# Patient Record
Sex: MALE | Race: WHITE | NOT HISPANIC OR LATINO | Employment: FULL TIME | URBAN - METROPOLITAN AREA
[De-identification: names, ages, dates, MRNs, and addresses within clinical notes are randomized per-mention and may not be internally consistent; named-entity substitution may affect disease eponyms.]

---
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
## 2019-01-01 ENCOUNTER — APPOINTMENT (OUTPATIENT)
Dept: RADIOLOGY | Facility: MEDICAL CENTER | Age: 19
DRG: 871 | End: 2019-01-01
Attending: INTERNAL MEDICINE
Payer: MEDICAID

## 2019-01-01 ENCOUNTER — APPOINTMENT (OUTPATIENT)
Dept: RADIOLOGY | Facility: MEDICAL CENTER | Age: 19
DRG: 951 | End: 2019-01-01
Payer: COMMERCIAL

## 2019-01-01 ENCOUNTER — APPOINTMENT (OUTPATIENT)
Dept: CARDIOLOGY | Facility: MEDICAL CENTER | Age: 19
DRG: 871 | End: 2019-01-01
Attending: INTERNAL MEDICINE
Payer: MEDICAID

## 2019-01-01 ENCOUNTER — HOSPITAL ENCOUNTER (INPATIENT)
Facility: MEDICAL CENTER | Age: 19
LOS: 2 days | DRG: 871 | End: 2019-12-07
Attending: EMERGENCY MEDICINE | Admitting: INTERNAL MEDICINE
Payer: MEDICAID

## 2019-01-01 ENCOUNTER — HOSPITAL ENCOUNTER (INPATIENT)
Facility: MEDICAL CENTER | Age: 19
LOS: 2 days | DRG: 951 | End: 2019-12-09
Admitting: INTERNAL MEDICINE
Payer: COMMERCIAL

## 2019-01-01 ENCOUNTER — APPOINTMENT (OUTPATIENT)
Dept: RADIOLOGY | Facility: MEDICAL CENTER | Age: 19
DRG: 871 | End: 2019-01-01
Attending: EMERGENCY MEDICINE
Payer: MEDICAID

## 2019-01-01 ENCOUNTER — APPOINTMENT (OUTPATIENT)
Dept: CARDIOLOGY | Facility: MEDICAL CENTER | Age: 19
DRG: 951 | End: 2019-01-01
Payer: COMMERCIAL

## 2019-01-01 ENCOUNTER — OFFICE VISIT (OUTPATIENT)
Dept: URGENT CARE | Facility: CLINIC | Age: 19
End: 2019-01-01

## 2019-01-01 ENCOUNTER — APPOINTMENT (OUTPATIENT)
Dept: CARDIOLOGY | Facility: MEDICAL CENTER | Age: 19
DRG: 951 | End: 2019-01-01
Attending: INTERNAL MEDICINE
Payer: COMMERCIAL

## 2019-01-01 ENCOUNTER — APPOINTMENT (OUTPATIENT)
Dept: RADIOLOGY | Facility: MEDICAL CENTER | Age: 19
DRG: 871 | End: 2019-01-01
Attending: PSYCHIATRY & NEUROLOGY
Payer: MEDICAID

## 2019-01-01 VITALS
DIASTOLIC BLOOD PRESSURE: 76 MMHG | OXYGEN SATURATION: 96 % | HEART RATE: 88 BPM | WEIGHT: 176 LBS | SYSTOLIC BLOOD PRESSURE: 118 MMHG | TEMPERATURE: 98 F | HEIGHT: 72 IN | BODY MASS INDEX: 23.84 KG/M2 | RESPIRATION RATE: 16 BRPM

## 2019-01-01 VITALS
WEIGHT: 163.14 LBS | OXYGEN SATURATION: 99 % | RESPIRATION RATE: 19 BRPM | HEART RATE: 84 BPM | DIASTOLIC BLOOD PRESSURE: 53 MMHG | SYSTOLIC BLOOD PRESSURE: 94 MMHG | HEIGHT: 70 IN | BODY MASS INDEX: 23.36 KG/M2 | TEMPERATURE: 97.7 F

## 2019-01-01 VITALS
WEIGHT: 175.93 LBS | BODY MASS INDEX: 25.19 KG/M2 | SYSTOLIC BLOOD PRESSURE: 105 MMHG | RESPIRATION RATE: 7 BRPM | TEMPERATURE: 97.5 F | DIASTOLIC BLOOD PRESSURE: 47 MMHG | OXYGEN SATURATION: 99 % | HEART RATE: 97 BPM | HEIGHT: 70 IN

## 2019-01-01 DIAGNOSIS — I46.9 CARDIOPULMONARY ARREST (HCC): ICD-10-CM

## 2019-01-01 DIAGNOSIS — E87.20 LACTIC ACIDOSIS: ICD-10-CM

## 2019-01-01 DIAGNOSIS — R94.31 ABNORMAL EKG: ICD-10-CM

## 2019-01-01 DIAGNOSIS — J96.01 ACUTE RESPIRATORY FAILURE WITH HYPOXIA (HCC): ICD-10-CM

## 2019-01-01 DIAGNOSIS — R79.89 ELEVATED TROPONIN: ICD-10-CM

## 2019-01-01 DIAGNOSIS — I46.9 CARDIAC ARREST (HCC): ICD-10-CM

## 2019-01-01 DIAGNOSIS — Z02.1 PHYSICAL EXAM, PRE-EMPLOYMENT: ICD-10-CM

## 2019-01-01 LAB
ABO GROUP BLD: NORMAL
ACTION RANGE TRIGGERED IACRT: NO
ACTION RANGE TRIGGERED IACRT: YES
ALBUMIN SERPL BCP-MCNC: 2.6 G/DL (ref 3.2–4.9)
ALBUMIN SERPL BCP-MCNC: 2.7 G/DL (ref 3.2–4.9)
ALBUMIN SERPL BCP-MCNC: 2.8 G/DL (ref 3.2–4.9)
ALBUMIN SERPL BCP-MCNC: 2.9 G/DL (ref 3.2–4.9)
ALBUMIN SERPL BCP-MCNC: 3 G/DL (ref 3.2–4.9)
ALBUMIN SERPL BCP-MCNC: 3.1 G/DL (ref 3.2–4.9)
ALBUMIN SERPL BCP-MCNC: 3.1 G/DL (ref 3.2–4.9)
ALBUMIN SERPL BCP-MCNC: NORMAL G/DL (ref 3.2–4.9)
ALBUMIN/GLOB SERPL: 1.3 G/DL
ALBUMIN/GLOB SERPL: 1.4 G/DL
ALBUMIN/GLOB SERPL: 1.5 G/DL
ALBUMIN/GLOB SERPL: 1.5 G/DL
ALBUMIN/GLOB SERPL: 1.6 G/DL
ALBUMIN/GLOB SERPL: NORMAL G/DL
ALP SERPL-CCNC: 100 U/L (ref 30–99)
ALP SERPL-CCNC: 53 U/L (ref 30–99)
ALP SERPL-CCNC: 61 U/L (ref 30–99)
ALP SERPL-CCNC: 64 U/L (ref 30–99)
ALP SERPL-CCNC: 65 U/L (ref 30–99)
ALP SERPL-CCNC: 66 U/L (ref 30–99)
ALP SERPL-CCNC: 71 U/L (ref 30–99)
ALP SERPL-CCNC: 71 U/L (ref 30–99)
ALP SERPL-CCNC: 74 U/L (ref 30–99)
ALP SERPL-CCNC: 76 U/L (ref 30–99)
ALP SERPL-CCNC: 77 U/L (ref 30–99)
ALP SERPL-CCNC: NORMAL U/L (ref 30–99)
ALT SERPL-CCNC: 1207 U/L (ref 2–50)
ALT SERPL-CCNC: 1321 U/L (ref 2–50)
ALT SERPL-CCNC: 1356 U/L (ref 2–50)
ALT SERPL-CCNC: 1446 U/L (ref 2–50)
ALT SERPL-CCNC: 1447 U/L (ref 2–50)
ALT SERPL-CCNC: 1601 U/L (ref 2–50)
ALT SERPL-CCNC: 1722 U/L (ref 2–50)
ALT SERPL-CCNC: 187 U/L (ref 2–50)
ALT SERPL-CCNC: 1910 U/L (ref 2–50)
ALT SERPL-CCNC: 2024 U/L (ref 2–50)
ALT SERPL-CCNC: 2118 U/L (ref 2–50)
ALT SERPL-CCNC: NORMAL U/L (ref 2–50)
AMORPH CRY #/AREA URNS HPF: PRESENT /HPF
AMPHET UR QL SCN: NEGATIVE
AMPHET UR QL SCN: NEGATIVE
AMYLASE SERPL-CCNC: 53 U/L (ref 20–103)
AMYLASE SERPL-CCNC: 95 U/L (ref 20–103)
ANION GAP SERPL CALC-SCNC: 10 MMOL/L (ref 0–11.9)
ANION GAP SERPL CALC-SCNC: 11 MMOL/L (ref 0–11.9)
ANION GAP SERPL CALC-SCNC: 12 MMOL/L (ref 0–11.9)
ANION GAP SERPL CALC-SCNC: 13 MMOL/L (ref 0–11.9)
ANION GAP SERPL CALC-SCNC: 14 MMOL/L (ref 0–11.9)
ANION GAP SERPL CALC-SCNC: 14 MMOL/L (ref 0–11.9)
ANION GAP SERPL CALC-SCNC: 15 MMOL/L (ref 0–11.9)
ANION GAP SERPL CALC-SCNC: 25 MMOL/L (ref 0–11.9)
ANION GAP SERPL CALC-SCNC: 4 MMOL/L (ref 0–11.9)
ANION GAP SERPL CALC-SCNC: 5 MMOL/L (ref 0–11.9)
ANION GAP SERPL CALC-SCNC: 5 MMOL/L (ref 0–11.9)
ANION GAP SERPL CALC-SCNC: 6 MMOL/L (ref 0–11.9)
ANION GAP SERPL CALC-SCNC: 7 MMOL/L (ref 0–11.9)
ANION GAP SERPL CALC-SCNC: 9 MMOL/L (ref 0–11.9)
ANION GAP SERPL CALC-SCNC: NORMAL MMOL/L (ref 0–11.9)
ANISOCYTOSIS BLD QL SMEAR: ABNORMAL
ANNOTATION COMMENT IMP: NORMAL
APAP SERPL-MCNC: <10 UG/ML (ref 10–30)
APPEARANCE FLD: NORMAL
APPEARANCE UR: ABNORMAL
APPEARANCE UR: ABNORMAL
APPEARANCE UR: CLEAR
APTT PPP: 26 SEC (ref 24.7–36)
APTT PPP: 26.2 SEC (ref 24.7–36)
APTT PPP: 28.1 SEC (ref 24.7–36)
APTT PPP: 28.5 SEC (ref 24.7–36)
APTT PPP: 28.9 SEC (ref 24.7–36)
APTT PPP: 30.2 SEC (ref 24.7–36)
APTT PPP: 31.4 SEC (ref 24.7–36)
APTT PPP: 35.9 SEC (ref 24.7–36)
APTT PPP: 37.7 SEC (ref 24.7–36)
AST SERPL-CCNC: 1016 U/L (ref 12–45)
AST SERPL-CCNC: 1256 U/L (ref 12–45)
AST SERPL-CCNC: 1298 U/L (ref 12–45)
AST SERPL-CCNC: 169 U/L (ref 12–45)
AST SERPL-CCNC: 277 U/L (ref 12–45)
AST SERPL-CCNC: 335 U/L (ref 12–45)
AST SERPL-CCNC: 376 U/L (ref 12–45)
AST SERPL-CCNC: 431 U/L (ref 12–45)
AST SERPL-CCNC: 477 U/L (ref 12–45)
AST SERPL-CCNC: 604 U/L (ref 12–45)
AST SERPL-CCNC: 740 U/L (ref 12–45)
AST SERPL-CCNC: NORMAL U/L (ref 12–45)
BACTERIA #/AREA URNS HPF: NEGATIVE /HPF
BACTERIA SPEC RESP CULT: ABNORMAL
BACTERIA SPEC RESP CULT: NORMAL
BACTERIA UR CULT: NORMAL
BARBITURATES UR QL SCN: NEGATIVE
BARBITURATES UR QL SCN: NEGATIVE
BASE EXCESS BLDA CALC-SCNC: -12 MMOL/L (ref -4–3)
BASE EXCESS BLDA CALC-SCNC: -14 MMOL/L (ref -4–3)
BASE EXCESS BLDA CALC-SCNC: -15 MMOL/L (ref -4–3)
BASE EXCESS BLDA CALC-SCNC: -2 MMOL/L (ref -4–3)
BASE EXCESS BLDA CALC-SCNC: -25 MMOL/L (ref -4–3)
BASE EXCESS BLDA CALC-SCNC: -5 MMOL/L (ref -4–3)
BASE EXCESS BLDA CALC-SCNC: 0 MMOL/L (ref -4–3)
BASE EXCESS BLDA CALC-SCNC: 1 MMOL/L (ref -4–3)
BASE EXCESS BLDA CALC-SCNC: 10 MMOL/L (ref -4–3)
BASE EXCESS BLDA CALC-SCNC: 2 MMOL/L (ref -4–3)
BASE EXCESS BLDA CALC-SCNC: 2 MMOL/L (ref -4–3)
BASE EXCESS BLDA CALC-SCNC: 3 MMOL/L (ref -4–3)
BASE EXCESS BLDA CALC-SCNC: 4 MMOL/L (ref -4–3)
BASE EXCESS BLDA CALC-SCNC: 5 MMOL/L (ref -4–3)
BASE EXCESS BLDA CALC-SCNC: 7 MMOL/L (ref -4–3)
BASE EXCESS BLDA CALC-SCNC: 7 MMOL/L (ref -4–3)
BASE EXCESS BLDA CALC-SCNC: 8 MMOL/L (ref -4–3)
BASE EXCESS BLDA CALC-SCNC: 9 MMOL/L (ref -4–3)
BASE EXCESS BLDA CALC-SCNC: 9 MMOL/L (ref -4–3)
BASE EXCESS BLDA CALC-SCNC: ABNORMAL MMOL/L (ref -4–3)
BASOPHILS # BLD AUTO: 0 % (ref 0–1.8)
BASOPHILS # BLD AUTO: 0.1 % (ref 0–1.8)
BASOPHILS # BLD AUTO: 0.2 % (ref 0–1.8)
BASOPHILS # BLD AUTO: 0.2 % (ref 0–1.8)
BASOPHILS # BLD AUTO: 0.7 % (ref 0–1.8)
BASOPHILS # BLD: 0 K/UL (ref 0–0.12)
BASOPHILS # BLD: 0.02 K/UL (ref 0–0.12)
BASOPHILS # BLD: 0.02 K/UL (ref 0–0.12)
BASOPHILS # BLD: 0.03 K/UL (ref 0–0.12)
BASOPHILS # BLD: 0.11 K/UL (ref 0–0.12)
BENZODIAZ UR QL SCN: NEGATIVE
BENZODIAZ UR QL SCN: NEGATIVE
BILIRUB CONJ SERPL-MCNC: 0.4 MG/DL (ref 0.1–0.5)
BILIRUB CONJ SERPL-MCNC: 0.4 MG/DL (ref 0.1–0.5)
BILIRUB CONJ SERPL-MCNC: 0.6 MG/DL (ref 0.1–0.5)
BILIRUB CONJ SERPL-MCNC: 0.7 MG/DL (ref 0.1–0.5)
BILIRUB CONJ SERPL-MCNC: 0.8 MG/DL (ref 0.1–0.5)
BILIRUB CONJ SERPL-MCNC: 1.1 MG/DL (ref 0.1–0.5)
BILIRUB CONJ SERPL-MCNC: 1.1 MG/DL (ref 0.1–0.5)
BILIRUB CONJ SERPL-MCNC: 1.4 MG/DL (ref 0.1–0.5)
BILIRUB CONJ SERPL-MCNC: 1.5 MG/DL (ref 0.1–0.5)
BILIRUB CONJ SERPL-MCNC: NORMAL MG/DL (ref 0.1–0.5)
BILIRUB INDIRECT SERPL-MCNC: 0.4 MG/DL (ref 0–1)
BILIRUB INDIRECT SERPL-MCNC: 0.5 MG/DL (ref 0–1)
BILIRUB INDIRECT SERPL-MCNC: 0.5 MG/DL (ref 0–1)
BILIRUB INDIRECT SERPL-MCNC: 0.6 MG/DL (ref 0–1)
BILIRUB INDIRECT SERPL-MCNC: 0.6 MG/DL (ref 0–1)
BILIRUB INDIRECT SERPL-MCNC: 0.7 MG/DL (ref 0–1)
BILIRUB INDIRECT SERPL-MCNC: 0.7 MG/DL (ref 0–1)
BILIRUB INDIRECT SERPL-MCNC: 0.8 MG/DL (ref 0–1)
BILIRUB INDIRECT SERPL-MCNC: 0.9 MG/DL (ref 0–1)
BILIRUB INDIRECT SERPL-MCNC: NORMAL MG/DL (ref 0–1)
BILIRUB SERPL-MCNC: 0.3 MG/DL (ref 0.1–1.5)
BILIRUB SERPL-MCNC: 0.8 MG/DL (ref 0.1–1.5)
BILIRUB SERPL-MCNC: 0.9 MG/DL (ref 0.1–1.5)
BILIRUB SERPL-MCNC: 1 MG/DL (ref 0.1–1.5)
BILIRUB SERPL-MCNC: 1 MG/DL (ref 0.1–1.5)
BILIRUB SERPL-MCNC: 1.3 MG/DL (ref 0.1–1.5)
BILIRUB SERPL-MCNC: 1.5 MG/DL (ref 0.1–1.5)
BILIRUB SERPL-MCNC: 1.6 MG/DL (ref 0.1–1.5)
BILIRUB SERPL-MCNC: 1.9 MG/DL (ref 0.1–1.5)
BILIRUB SERPL-MCNC: 2.1 MG/DL (ref 0.1–1.5)
BILIRUB SERPL-MCNC: 2.4 MG/DL (ref 0.1–1.5)
BILIRUB SERPL-MCNC: NORMAL MG/DL (ref 0.1–1.5)
BILIRUB UR QL STRIP.AUTO: NEGATIVE
BLD GP AB SCN SERPL QL: NORMAL
BODY FLD TYPE: NORMAL
BODY TEMPERATURE: 35.2 CENTIGRADE
BODY TEMPERATURE: 36.4 CENTIGRADE
BODY TEMPERATURE: 36.5 CENTIGRADE
BODY TEMPERATURE: 36.8 CENTIGRADE
BODY TEMPERATURE: 37.1 CENTIGRADE
BODY TEMPERATURE: ABNORMAL CENTIGRADE
BODY TEMPERATURE: ABNORMAL DEGREES
BUN SERPL-MCNC: 23 MG/DL (ref 8–22)
BUN SERPL-MCNC: 24 MG/DL (ref 8–22)
BUN SERPL-MCNC: 24 MG/DL (ref 8–22)
BUN SERPL-MCNC: 27 MG/DL (ref 8–22)
BUN SERPL-MCNC: 28 MG/DL (ref 8–22)
BUN SERPL-MCNC: 29 MG/DL (ref 8–22)
BUN SERPL-MCNC: 29 MG/DL (ref 8–22)
BUN SERPL-MCNC: 30 MG/DL (ref 8–22)
BUN SERPL-MCNC: 31 MG/DL (ref 8–22)
BUN SERPL-MCNC: 31 MG/DL (ref 8–22)
BUN SERPL-MCNC: 32 MG/DL (ref 8–22)
BUN SERPL-MCNC: NORMAL MG/DL (ref 8–22)
BZE UR QL SCN: NEGATIVE
BZE UR QL SCN: NEGATIVE
C PNEUM IGG TITR SER IF: NORMAL {TITER}
C PSITTACI IGG TITR SER IF: NORMAL {TITER}
C TRACH IGG TITR SER IF: NORMAL {TITER}
CA-I BLD ISE-SCNC: 0.95 MMOL/L (ref 1.1–1.3)
CA-I SERPL-SCNC: 1 MMOL/L (ref 1.1–1.3)
CA-I SERPL-SCNC: 1 MMOL/L (ref 1.1–1.3)
CA-I SERPL-SCNC: 1.1 MMOL/L (ref 1.1–1.3)
CA-I SERPL-SCNC: 1.2 MMOL/L (ref 1.1–1.3)
CALCIUM SERPL-MCNC: 6.2 MG/DL (ref 8.5–10.5)
CALCIUM SERPL-MCNC: 6.4 MG/DL (ref 8.5–10.5)
CALCIUM SERPL-MCNC: 6.6 MG/DL (ref 8.5–10.5)
CALCIUM SERPL-MCNC: 6.7 MG/DL (ref 8.5–10.5)
CALCIUM SERPL-MCNC: 7 MG/DL (ref 8.5–10.5)
CALCIUM SERPL-MCNC: 7.4 MG/DL (ref 8.5–10.5)
CALCIUM SERPL-MCNC: 7.5 MG/DL (ref 8.5–10.5)
CALCIUM SERPL-MCNC: 7.6 MG/DL (ref 8.5–10.5)
CALCIUM SERPL-MCNC: 7.8 MG/DL (ref 8.5–10.5)
CALCIUM SERPL-MCNC: 7.9 MG/DL (ref 8.5–10.5)
CALCIUM SERPL-MCNC: 7.9 MG/DL (ref 8.5–10.5)
CALCIUM SERPL-MCNC: 8 MG/DL (ref 8.5–10.5)
CALCIUM SERPL-MCNC: 8.1 MG/DL (ref 8.5–10.5)
CALCIUM SERPL-MCNC: 8.1 MG/DL (ref 8.5–10.5)
CALCIUM SERPL-MCNC: 8.2 MG/DL (ref 8.5–10.5)
CALCIUM SERPL-MCNC: NORMAL MG/DL (ref 8.5–10.5)
CANNABINOIDS UR QL SCN: NEGATIVE
CANNABINOIDS UR QL SCN: NEGATIVE
CD3 CELLS # BLD: 2072 CELLS/UL (ref 570–2400)
CD3+CD4+ CELLS # BLD: 940 CELLS/UL (ref 430–1800)
CD3+CD4+ CELLS/CD3+CD8+ CLL BLD: 0.85 RATIO (ref 0.8–3.9)
CD3+CD8+ CELLS # BLD: 1103 CELLS/UL (ref 210–1200)
CHLORIDE SERPL-SCNC: 100 MMOL/L (ref 96–112)
CHLORIDE SERPL-SCNC: 102 MMOL/L (ref 96–112)
CHLORIDE SERPL-SCNC: 102 MMOL/L (ref 96–112)
CHLORIDE SERPL-SCNC: 103 MMOL/L (ref 96–112)
CHLORIDE SERPL-SCNC: 104 MMOL/L (ref 96–112)
CHLORIDE SERPL-SCNC: 105 MMOL/L (ref 96–112)
CHLORIDE SERPL-SCNC: 107 MMOL/L (ref 96–112)
CHLORIDE SERPL-SCNC: 107 MMOL/L (ref 96–112)
CHLORIDE SERPL-SCNC: 109 MMOL/L (ref 96–112)
CHLORIDE SERPL-SCNC: 113 MMOL/L (ref 96–112)
CHLORIDE SERPL-SCNC: 114 MMOL/L (ref 96–112)
CHLORIDE SERPL-SCNC: 115 MMOL/L (ref 96–112)
CHLORIDE SERPL-SCNC: 116 MMOL/L (ref 96–112)
CHLORIDE SERPL-SCNC: 116 MMOL/L (ref 96–112)
CHLORIDE SERPL-SCNC: 118 MMOL/L (ref 96–112)
CHLORIDE SERPL-SCNC: NORMAL MMOL/L (ref 96–112)
CK MB SERPL-MCNC: 10.6 NG/ML (ref 0–5)
CK MB SERPL-MCNC: 14.9 NG/ML (ref 0–5)
CK MB SERPL-MCNC: 17.4 NG/ML (ref 0–5)
CK MB SERPL-MCNC: 20.9 NG/ML (ref 0–5)
CK MB SERPL-MCNC: 25.3 NG/ML (ref 0–5)
CK MB SERPL-MCNC: 34.2 NG/ML (ref 0–5)
CK MB SERPL-MCNC: 44.7 NG/ML (ref 0–5)
CK MB SERPL-MCNC: 56.8 NG/ML (ref 0–5)
CK MB SERPL-MCNC: 9 NG/ML (ref 0–5)
CK MB SERPL-MCNC: NORMAL NG/ML (ref 0–5)
CK SERPL-CCNC: 1010 U/L (ref 0–154)
CK SERPL-CCNC: 1239 U/L (ref 0–154)
CK SERPL-CCNC: 144 U/L (ref 0–154)
CK SERPL-CCNC: 2048 U/L (ref 0–154)
CK SERPL-CCNC: 2664 U/L (ref 0–154)
CK SERPL-CCNC: 3834 U/L (ref 0–154)
CK SERPL-CCNC: 5101 U/L (ref 0–154)
CK SERPL-CCNC: 7464 U/L (ref 0–154)
CK SERPL-CCNC: 8112 U/L (ref 0–154)
CK SERPL-CCNC: 9181 U/L (ref 0–154)
CK SERPL-CCNC: NORMAL U/L (ref 0–154)
CO2 BLDA-SCNC: 17 MMOL/L (ref 20–33)
CO2 BLDA-SCNC: 17 MMOL/L (ref 20–33)
CO2 BLDA-SCNC: 18 MMOL/L (ref 20–33)
CO2 BLDA-SCNC: 22 MMOL/L (ref 20–33)
CO2 BLDA-SCNC: 24 MMOL/L (ref 20–33)
CO2 BLDA-SCNC: 29 MMOL/L (ref 20–33)
CO2 BLDA-SCNC: 34 MMOL/L (ref 20–33)
CO2 BLDA-SCNC: 39 MMOL/L (ref 20–33)
CO2 BLDA-SCNC: ABNORMAL MMOL/L (ref 20–33)
CO2 SERPL-SCNC: 15 MMOL/L (ref 20–33)
CO2 SERPL-SCNC: 16 MMOL/L (ref 20–33)
CO2 SERPL-SCNC: 19 MMOL/L (ref 20–33)
CO2 SERPL-SCNC: 21 MMOL/L (ref 20–33)
CO2 SERPL-SCNC: 22 MMOL/L (ref 20–33)
CO2 SERPL-SCNC: 23 MMOL/L (ref 20–33)
CO2 SERPL-SCNC: 27 MMOL/L (ref 20–33)
CO2 SERPL-SCNC: 29 MMOL/L (ref 20–33)
CO2 SERPL-SCNC: 30 MMOL/L (ref 20–33)
CO2 SERPL-SCNC: 31 MMOL/L (ref 20–33)
CO2 SERPL-SCNC: 31 MMOL/L (ref 20–33)
CO2 SERPL-SCNC: NORMAL MMOL/L (ref 20–33)
COHGB MFR BLD: 1 % (ref 0–4.9)
COLOR FLD: NORMAL
COLOR UR: ABNORMAL
COLOR UR: YELLOW
CREAT SERPL-MCNC: 1.21 MG/DL (ref 0.5–1.4)
CREAT SERPL-MCNC: 1.25 MG/DL (ref 0.5–1.4)
CREAT SERPL-MCNC: 1.27 MG/DL (ref 0.5–1.4)
CREAT SERPL-MCNC: 1.28 MG/DL (ref 0.5–1.4)
CREAT SERPL-MCNC: 1.32 MG/DL (ref 0.5–1.4)
CREAT SERPL-MCNC: 1.33 MG/DL (ref 0.5–1.4)
CREAT SERPL-MCNC: 1.4 MG/DL (ref 0.5–1.4)
CREAT SERPL-MCNC: 1.7 MG/DL (ref 0.5–1.4)
CREAT SERPL-MCNC: 1.71 MG/DL (ref 0.5–1.4)
CREAT SERPL-MCNC: 1.71 MG/DL (ref 0.5–1.4)
CREAT SERPL-MCNC: 1.72 MG/DL (ref 0.5–1.4)
CREAT SERPL-MCNC: 1.76 MG/DL (ref 0.5–1.4)
CREAT SERPL-MCNC: 1.76 MG/DL (ref 0.5–1.4)
CREAT SERPL-MCNC: 1.77 MG/DL (ref 0.5–1.4)
CREAT SERPL-MCNC: 1.79 MG/DL (ref 0.5–1.4)
CREAT SERPL-MCNC: 1.79 MG/DL (ref 0.5–1.4)
CREAT SERPL-MCNC: 1.89 MG/DL (ref 0.5–1.4)
CREAT SERPL-MCNC: 1.94 MG/DL (ref 0.5–1.4)
CREAT SERPL-MCNC: 1.99 MG/DL (ref 0.5–1.4)
CREAT SERPL-MCNC: 2.09 MG/DL (ref 0.5–1.4)
CREAT SERPL-MCNC: 2.91 MG/DL (ref 0.5–1.4)
CREAT SERPL-MCNC: NORMAL MG/DL (ref 0.5–1.4)
CSF COMMENTS 1658: NORMAL
EKG IMPRESSION: NORMAL
EOSINOPHIL # BLD AUTO: 0 K/UL (ref 0–0.51)
EOSINOPHIL # BLD AUTO: 0.17 K/UL (ref 0–0.51)
EOSINOPHIL NFR BLD: 0 % (ref 0–6.9)
EOSINOPHIL NFR BLD: 0.9 % (ref 0–6.9)
EPI CELLS #/AREA URNS HPF: NEGATIVE /HPF
ERYTHROCYTE [DISTWIDTH] IN BLOOD BY AUTOMATED COUNT: 42.4 FL (ref 35.9–50)
ERYTHROCYTE [DISTWIDTH] IN BLOOD BY AUTOMATED COUNT: 43.7 FL (ref 35.9–50)
ERYTHROCYTE [DISTWIDTH] IN BLOOD BY AUTOMATED COUNT: 44.3 FL (ref 35.9–50)
ERYTHROCYTE [DISTWIDTH] IN BLOOD BY AUTOMATED COUNT: 44.7 FL (ref 35.9–50)
ERYTHROCYTE [DISTWIDTH] IN BLOOD BY AUTOMATED COUNT: 45.7 FL (ref 35.9–50)
ERYTHROCYTE [DISTWIDTH] IN BLOOD BY AUTOMATED COUNT: 45.9 FL (ref 35.9–50)
ERYTHROCYTE [DISTWIDTH] IN BLOOD BY AUTOMATED COUNT: 46 FL (ref 35.9–50)
ERYTHROCYTE [DISTWIDTH] IN BLOOD BY AUTOMATED COUNT: 46.2 FL (ref 35.9–50)
ERYTHROCYTE [DISTWIDTH] IN BLOOD BY AUTOMATED COUNT: 46.4 FL (ref 35.9–50)
ERYTHROCYTE [DISTWIDTH] IN BLOOD BY AUTOMATED COUNT: 46.5 FL (ref 35.9–50)
ERYTHROCYTE [DISTWIDTH] IN BLOOD BY AUTOMATED COUNT: 46.6 FL (ref 35.9–50)
ERYTHROCYTE [DISTWIDTH] IN BLOOD BY AUTOMATED COUNT: 46.7 FL (ref 35.9–50)
EST. AVERAGE GLUCOSE BLD GHB EST-MCNC: 114 MG/DL
EST. AVERAGE GLUCOSE BLD GHB EST-MCNC: 117 MG/DL
ETHANOL BLD-MCNC: 0 G/DL
FIBRINOGEN PPP-MCNC: 196 MG/DL (ref 215–460)
FLUAV RNA SPEC QL NAA+PROBE: NEGATIVE
FLUBV RNA SPEC QL NAA+PROBE: POSITIVE
FUNGUS SPEC FUNGUS STN: NORMAL
FUNGUS SPEC FUNGUS STN: NORMAL
GGT SERPL-CCNC: 12 U/L (ref 7–51)
GGT SERPL-CCNC: 75 U/L (ref 7–51)
GGT SERPL-CCNC: 76 U/L (ref 7–51)
GGT SERPL-CCNC: 77 U/L (ref 7–51)
GGT SERPL-CCNC: 82 U/L (ref 7–51)
GGT SERPL-CCNC: 82 U/L (ref 7–51)
GGT SERPL-CCNC: 83 U/L (ref 7–51)
GGT SERPL-CCNC: 84 U/L (ref 7–51)
GGT SERPL-CCNC: 91 U/L (ref 7–51)
GLOBULIN SER CALC-MCNC: 1.8 G/DL (ref 1.9–3.5)
GLOBULIN SER CALC-MCNC: 1.9 G/DL (ref 1.9–3.5)
GLOBULIN SER CALC-MCNC: 1.9 G/DL (ref 1.9–3.5)
GLOBULIN SER CALC-MCNC: 2 G/DL (ref 1.9–3.5)
GLOBULIN SER CALC-MCNC: 2.1 G/DL (ref 1.9–3.5)
GLOBULIN SER CALC-MCNC: 2.2 G/DL (ref 1.9–3.5)
GLOBULIN SER CALC-MCNC: 2.2 G/DL (ref 1.9–3.5)
GLOBULIN SER CALC-MCNC: NORMAL G/DL (ref 1.9–3.5)
GLUCOSE BLD-MCNC: 100 MG/DL (ref 65–99)
GLUCOSE BLD-MCNC: 107 MG/DL (ref 65–99)
GLUCOSE BLD-MCNC: 108 MG/DL (ref 65–99)
GLUCOSE BLD-MCNC: 118 MG/DL (ref 65–99)
GLUCOSE BLD-MCNC: 126 MG/DL (ref 65–99)
GLUCOSE BLD-MCNC: 127 MG/DL (ref 65–99)
GLUCOSE BLD-MCNC: 132 MG/DL (ref 65–99)
GLUCOSE BLD-MCNC: 132 MG/DL (ref 65–99)
GLUCOSE BLD-MCNC: 137 MG/DL (ref 65–99)
GLUCOSE BLD-MCNC: 138 MG/DL (ref 65–99)
GLUCOSE BLD-MCNC: 141 MG/DL (ref 65–99)
GLUCOSE BLD-MCNC: 141 MG/DL (ref 65–99)
GLUCOSE BLD-MCNC: 145 MG/DL (ref 65–99)
GLUCOSE BLD-MCNC: 146 MG/DL (ref 65–99)
GLUCOSE BLD-MCNC: 147 MG/DL (ref 65–99)
GLUCOSE BLD-MCNC: 151 MG/DL (ref 65–99)
GLUCOSE BLD-MCNC: 152 MG/DL (ref 65–99)
GLUCOSE BLD-MCNC: 157 MG/DL (ref 65–99)
GLUCOSE BLD-MCNC: 159 MG/DL (ref 65–99)
GLUCOSE BLD-MCNC: 160 MG/DL (ref 65–99)
GLUCOSE BLD-MCNC: 160 MG/DL (ref 65–99)
GLUCOSE BLD-MCNC: 161 MG/DL (ref 65–99)
GLUCOSE BLD-MCNC: 167 MG/DL (ref 65–99)
GLUCOSE BLD-MCNC: 173 MG/DL (ref 65–99)
GLUCOSE BLD-MCNC: 176 MG/DL (ref 65–99)
GLUCOSE BLD-MCNC: 177 MG/DL (ref 65–99)
GLUCOSE BLD-MCNC: 179 MG/DL (ref 65–99)
GLUCOSE BLD-MCNC: 186 MG/DL (ref 65–99)
GLUCOSE BLD-MCNC: 186 MG/DL (ref 65–99)
GLUCOSE BLD-MCNC: 192 MG/DL (ref 65–99)
GLUCOSE BLD-MCNC: 193 MG/DL (ref 65–99)
GLUCOSE BLD-MCNC: 193 MG/DL (ref 65–99)
GLUCOSE BLD-MCNC: 196 MG/DL (ref 65–99)
GLUCOSE BLD-MCNC: 197 MG/DL (ref 65–99)
GLUCOSE BLD-MCNC: 201 MG/DL (ref 65–99)
GLUCOSE BLD-MCNC: 204 MG/DL (ref 65–99)
GLUCOSE BLD-MCNC: 207 MG/DL (ref 65–99)
GLUCOSE BLD-MCNC: 212 MG/DL (ref 65–99)
GLUCOSE BLD-MCNC: 214 MG/DL (ref 65–99)
GLUCOSE BLD-MCNC: 221 MG/DL (ref 65–99)
GLUCOSE BLD-MCNC: 227 MG/DL (ref 65–99)
GLUCOSE BLD-MCNC: 237 MG/DL (ref 65–99)
GLUCOSE BLD-MCNC: 260 MG/DL (ref 65–99)
GLUCOSE BLD-MCNC: 260 MG/DL (ref 65–99)
GLUCOSE BLD-MCNC: 261 MG/DL (ref 65–99)
GLUCOSE BLD-MCNC: 288 MG/DL (ref 65–99)
GLUCOSE BLD-MCNC: 33 MG/DL (ref 65–99)
GLUCOSE BLD-MCNC: 438 MG/DL (ref 65–99)
GLUCOSE BLD-MCNC: 45 MG/DL (ref 65–99)
GLUCOSE BLD-MCNC: 77 MG/DL (ref 65–99)
GLUCOSE BLD-MCNC: 94 MG/DL (ref 65–99)
GLUCOSE SERPL-MCNC: 104 MG/DL (ref 65–99)
GLUCOSE SERPL-MCNC: 105 MG/DL (ref 65–99)
GLUCOSE SERPL-MCNC: 122 MG/DL (ref 65–99)
GLUCOSE SERPL-MCNC: 123 MG/DL (ref 65–99)
GLUCOSE SERPL-MCNC: 124 MG/DL (ref 65–99)
GLUCOSE SERPL-MCNC: 141 MG/DL (ref 65–99)
GLUCOSE SERPL-MCNC: 149 MG/DL (ref 65–99)
GLUCOSE SERPL-MCNC: 157 MG/DL (ref 65–99)
GLUCOSE SERPL-MCNC: 162 MG/DL (ref 65–99)
GLUCOSE SERPL-MCNC: 162 MG/DL (ref 65–99)
GLUCOSE SERPL-MCNC: 164 MG/DL (ref 65–99)
GLUCOSE SERPL-MCNC: 164 MG/DL (ref 65–99)
GLUCOSE SERPL-MCNC: 167 MG/DL (ref 65–99)
GLUCOSE SERPL-MCNC: 184 MG/DL (ref 65–99)
GLUCOSE SERPL-MCNC: 185 MG/DL (ref 65–99)
GLUCOSE SERPL-MCNC: 188 MG/DL (ref 65–99)
GLUCOSE SERPL-MCNC: 207 MG/DL (ref 65–99)
GLUCOSE SERPL-MCNC: 246 MG/DL (ref 65–99)
GLUCOSE SERPL-MCNC: 259 MG/DL (ref 65–99)
GLUCOSE SERPL-MCNC: 270 MG/DL (ref 65–99)
GLUCOSE SERPL-MCNC: 323 MG/DL (ref 65–99)
GLUCOSE SERPL-MCNC: NORMAL MG/DL (ref 65–99)
GLUCOSE UR STRIP.AUTO-MCNC: 100 MG/DL
GLUCOSE UR STRIP.AUTO-MCNC: >=1000 MG/DL
GLUCOSE UR STRIP.AUTO-MCNC: NEGATIVE MG/DL
GRAM STN SPEC: ABNORMAL
GRAM STN SPEC: ABNORMAL
GRAM STN SPEC: NORMAL
GRAM STN SPEC: NORMAL
HAV IGM SERPL QL IA: NEGATIVE
HBA1C MFR BLD: 5.6 % (ref 0–5.6)
HBA1C MFR BLD: 5.7 % (ref 0–5.6)
HBV CORE IGM SER QL: NEGATIVE
HBV SURFACE AG SER QL: NEGATIVE
HCO3 BLDA-SCNC: 11 MMOL/L (ref 17–25)
HCO3 BLDA-SCNC: 15.6 MMOL/L (ref 17–25)
HCO3 BLDA-SCNC: 16 MMOL/L (ref 17–25)
HCO3 BLDA-SCNC: 16.1 MMOL/L (ref 17–25)
HCO3 BLDA-SCNC: 21.1 MMOL/L (ref 17–25)
HCO3 BLDA-SCNC: 22.9 MMOL/L (ref 17–25)
HCO3 BLDA-SCNC: 23 MMOL/L (ref 17–25)
HCO3 BLDA-SCNC: 26 MMOL/L (ref 17–25)
HCO3 BLDA-SCNC: 27 MMOL/L (ref 17–25)
HCO3 BLDA-SCNC: 27.9 MMOL/L (ref 17–25)
HCO3 BLDA-SCNC: 28 MMOL/L (ref 17–25)
HCO3 BLDA-SCNC: 28 MMOL/L (ref 17–25)
HCO3 BLDA-SCNC: 29 MMOL/L (ref 17–25)
HCO3 BLDA-SCNC: 30 MMOL/L (ref 17–25)
HCO3 BLDA-SCNC: 31 MMOL/L (ref 17–25)
HCO3 BLDA-SCNC: 32.9 MMOL/L (ref 17–25)
HCO3 BLDA-SCNC: 33 MMOL/L (ref 17–25)
HCO3 BLDA-SCNC: 33 MMOL/L (ref 17–25)
HCO3 BLDA-SCNC: 36.4 MMOL/L (ref 17–25)
HCO3 BLDA-SCNC: ABNORMAL MMOL/L (ref 17–25)
HCT VFR BLD AUTO: 29.3 % (ref 42–52)
HCT VFR BLD AUTO: 29.6 % (ref 42–52)
HCT VFR BLD AUTO: 30.6 % (ref 42–52)
HCT VFR BLD AUTO: 31 % (ref 42–52)
HCT VFR BLD AUTO: 31.1 % (ref 42–52)
HCT VFR BLD AUTO: 31.8 % (ref 42–52)
HCT VFR BLD AUTO: 32.5 % (ref 42–52)
HCT VFR BLD AUTO: 33.6 % (ref 42–52)
HCT VFR BLD AUTO: 35.4 % (ref 42–52)
HCT VFR BLD AUTO: 35.7 % (ref 42–52)
HCT VFR BLD AUTO: 43.8 % (ref 42–52)
HCT VFR BLD AUTO: 46.1 % (ref 42–52)
HCT VFR BLD CALC: 37 % (ref 42–52)
HCV AB SER QL: NEGATIVE
HGB BLD-MCNC: 10 G/DL (ref 14–18)
HGB BLD-MCNC: 10 G/DL (ref 14–18)
HGB BLD-MCNC: 10.1 G/DL (ref 14–18)
HGB BLD-MCNC: 10.5 G/DL (ref 14–18)
HGB BLD-MCNC: 11 G/DL (ref 14–18)
HGB BLD-MCNC: 11.5 G/DL (ref 14–18)
HGB BLD-MCNC: 11.6 G/DL (ref 14–18)
HGB BLD-MCNC: 12.1 G/DL (ref 14–18)
HGB BLD-MCNC: 12.6 G/DL (ref 14–18)
HGB BLD-MCNC: 14.3 G/DL (ref 14–18)
HGB BLD-MCNC: 14.9 G/DL (ref 14–18)
HGB BLD-MCNC: 9.7 G/DL (ref 14–18)
HGB BLD-MCNC: 9.9 G/DL (ref 14–18)
HIV 1+2 AB+HIV1 P24 AG SERPL QL IA: NON REACTIVE
HOROWITZ INDEX BLDA+IHG-RTO: 159 MM[HG]
HOROWITZ INDEX BLDA+IHG-RTO: 327 MM[HG]
HOROWITZ INDEX BLDA+IHG-RTO: 368 MM[HG]
HOROWITZ INDEX BLDA+IHG-RTO: 370 MM[HG]
HOROWITZ INDEX BLDA+IHG-RTO: 431 MM[HG]
HOROWITZ INDEX BLDA+IHG-RTO: 465 MM[HG]
HOROWITZ INDEX BLDA+IHG-RTO: 62 MM[HG]
HOROWITZ INDEX BLDA+IHG-RTO: 64 MM[HG]
HOROWITZ INDEX BLDA+IHG-RTO: 86 MM[HG]
HYALINE CASTS #/AREA URNS LPF: ABNORMAL /LPF
IMM GRANULOCYTES # BLD AUTO: 0.04 K/UL (ref 0–0.11)
IMM GRANULOCYTES # BLD AUTO: 0.04 K/UL (ref 0–0.11)
IMM GRANULOCYTES # BLD AUTO: 0.06 K/UL (ref 0–0.11)
IMM GRANULOCYTES # BLD AUTO: 0.08 K/UL (ref 0–0.11)
IMM GRANULOCYTES NFR BLD AUTO: 0.3 % (ref 0–0.9)
IMM GRANULOCYTES NFR BLD AUTO: 0.3 % (ref 0–0.9)
IMM GRANULOCYTES NFR BLD AUTO: 0.4 % (ref 0–0.9)
IMM GRANULOCYTES NFR BLD AUTO: 0.6 % (ref 0–0.9)
INR PPP: 1.18 (ref 0.87–1.13)
INR PPP: 1.28 (ref 0.87–1.13)
INR PPP: 1.29 (ref 0.87–1.13)
INR PPP: 1.3 (ref 0.87–1.13)
INR PPP: 1.31 (ref 0.87–1.13)
INR PPP: 1.31 (ref 0.87–1.13)
INR PPP: 1.43 (ref 0.87–1.13)
INR PPP: 1.51 (ref 0.87–1.13)
INR PPP: 1.68 (ref 0.87–1.13)
INST. QUALIFIED PATIENT IIQPT: YES
KETONES UR STRIP.AUTO-MCNC: NEGATIVE MG/DL
L PNEUMO IGG TITR SER IF: NORMAL {TITER}
LACTATE BLD-SCNC: 1.2 MMOL/L (ref 0.5–2)
LACTATE BLD-SCNC: 1.4 MMOL/L (ref 0.5–2)
LACTATE BLD-SCNC: 1.5 MMOL/L (ref 0.5–2)
LACTATE BLD-SCNC: 1.6 MMOL/L (ref 0.5–2)
LACTATE BLD-SCNC: 1.7 MMOL/L (ref 0.5–2)
LACTATE BLD-SCNC: 1.9 MMOL/L (ref 0.5–2)
LACTATE BLD-SCNC: 11.3 MMOL/L (ref 0.5–2)
LACTATE BLD-SCNC: 2.6 MMOL/L (ref 0.5–2)
LACTATE BLD-SCNC: 3.3 MMOL/L (ref 0.5–2)
LACTATE BLD-SCNC: 3.4 MMOL/L (ref 0.5–2)
LACTATE BLD-SCNC: 4.3 MMOL/L (ref 0.5–2)
LACTATE BLD-SCNC: 4.5 MMOL/L (ref 0.5–2)
LACTATE BLD-SCNC: 4.6 MMOL/L (ref 0.5–2)
LACTATE BLD-SCNC: 4.8 MMOL/L (ref 0.5–2)
LACTATE BLD-SCNC: 6 MMOL/L (ref 0.5–2)
LACTATE BLD-SCNC: 6.2 MMOL/L (ref 0.5–2)
LACTATE BLD-SCNC: 6.9 MMOL/L (ref 0.5–2)
LACTATE BLD-SCNC: NORMAL MMOL/L (ref 0.5–2)
LEUKOCYTE ESTERASE UR QL STRIP.AUTO: NEGATIVE
LIPASE SERPL-CCNC: 20 U/L (ref 11–82)
LIPASE SERPL-CCNC: 68 U/L (ref 11–82)
LV EJECT FRACT  99904: 60
LV EJECT FRACT  99904: 65
LV EJECT FRACT MOD 2C 99903: 63.28
LV EJECT FRACT MOD 2C 99903: 67.2
LV EJECT FRACT MOD 4C 99902: 61.7
LV EJECT FRACT MOD 4C 99902: 71.46
LV EJECT FRACT MOD BP 99901: 63.52
LV EJECT FRACT MOD BP 99901: 68.62
LYMPHOCYTES # BLD AUTO: 0.12 K/UL (ref 1–4.8)
LYMPHOCYTES # BLD AUTO: 0.49 K/UL (ref 1–4.8)
LYMPHOCYTES # BLD AUTO: 0.71 K/UL (ref 1–4.8)
LYMPHOCYTES # BLD AUTO: 0.74 K/UL (ref 1–4.8)
LYMPHOCYTES # BLD AUTO: 0.78 K/UL (ref 1–4.8)
LYMPHOCYTES # BLD AUTO: 0.81 K/UL (ref 1–4.8)
LYMPHOCYTES # BLD AUTO: 0.86 K/UL (ref 1–4.8)
LYMPHOCYTES # BLD AUTO: 1.05 K/UL (ref 1–4.8)
LYMPHOCYTES # BLD AUTO: 1.14 K/UL (ref 1–4.8)
LYMPHOCYTES # BLD AUTO: 1.99 K/UL (ref 1–4.8)
LYMPHOCYTES # BLD AUTO: 3.18 K/UL (ref 1–4.8)
LYMPHOCYTES # BLD AUTO: 4.15 K/UL (ref 1–4.8)
LYMPHOCYTES NFR BLD: 0.9 % (ref 22–41)
LYMPHOCYTES NFR BLD: 17 % (ref 22–41)
LYMPHOCYTES NFR BLD: 3.5 % (ref 22–41)
LYMPHOCYTES NFR BLD: 4.3 % (ref 22–41)
LYMPHOCYTES NFR BLD: 5 % (ref 22–41)
LYMPHOCYTES NFR BLD: 5.2 % (ref 22–41)
LYMPHOCYTES NFR BLD: 5.4 % (ref 22–41)
LYMPHOCYTES NFR BLD: 5.6 % (ref 22–41)
LYMPHOCYTES NFR BLD: 58.4 % (ref 22–41)
LYMPHOCYTES NFR BLD: 6 % (ref 22–41)
LYMPHOCYTES NFR BLD: 6.9 % (ref 22–41)
LYMPHOCYTES NFR BLD: 7.5 % (ref 22–41)
M PNEUMO IGG SER IA-ACNC: 0.02 U/L
M PNEUMO IGM SER IA-ACNC: 0.17 U/L
MACROCYTES BLD QL SMEAR: ABNORMAL
MAGNESIUM SERPL-MCNC: 1.1 MG/DL (ref 1.5–2.5)
MAGNESIUM SERPL-MCNC: 2 MG/DL (ref 1.5–2.5)
MAGNESIUM SERPL-MCNC: 2 MG/DL (ref 1.5–2.5)
MAGNESIUM SERPL-MCNC: 2.1 MG/DL (ref 1.5–2.5)
MAGNESIUM SERPL-MCNC: 2.2 MG/DL (ref 1.5–2.5)
MAGNESIUM SERPL-MCNC: 2.2 MG/DL (ref 1.5–2.5)
MAGNESIUM SERPL-MCNC: 2.3 MG/DL (ref 1.5–2.5)
MAGNESIUM SERPL-MCNC: 2.3 MG/DL (ref 1.5–2.5)
MAGNESIUM SERPL-MCNC: 2.4 MG/DL (ref 1.5–2.5)
MAGNESIUM SERPL-MCNC: 2.5 MG/DL (ref 1.5–2.5)
MAGNESIUM SERPL-MCNC: 2.7 MG/DL (ref 1.5–2.5)
MAGNESIUM SERPL-MCNC: NORMAL MG/DL (ref 1.5–2.5)
MANUAL DIFF BLD: ABNORMAL
MANUAL DIFF BLD: ABNORMAL
MANUAL DIFF BLD: NORMAL
MCH RBC QN AUTO: 29.7 PG (ref 27–33)
MCH RBC QN AUTO: 29.9 PG (ref 27–33)
MCH RBC QN AUTO: 30.1 PG (ref 27–33)
MCH RBC QN AUTO: 30.1 PG (ref 27–33)
MCH RBC QN AUTO: 30.2 PG (ref 27–33)
MCH RBC QN AUTO: 30.2 PG (ref 27–33)
MCH RBC QN AUTO: 30.3 PG (ref 27–33)
MCH RBC QN AUTO: 30.3 PG (ref 27–33)
MCH RBC QN AUTO: 30.5 PG (ref 27–33)
MCH RBC QN AUTO: 30.6 PG (ref 27–33)
MCH RBC QN AUTO: 30.7 PG (ref 27–33)
MCH RBC QN AUTO: 31.1 PG (ref 27–33)
MCHC RBC AUTO-ENTMCNC: 31 G/DL (ref 33.7–35.3)
MCHC RBC AUTO-ENTMCNC: 32.2 G/DL (ref 33.7–35.3)
MCHC RBC AUTO-ENTMCNC: 32.5 G/DL (ref 33.7–35.3)
MCHC RBC AUTO-ENTMCNC: 32.6 G/DL (ref 33.7–35.3)
MCHC RBC AUTO-ENTMCNC: 32.7 G/DL (ref 33.7–35.3)
MCHC RBC AUTO-ENTMCNC: 32.8 G/DL (ref 33.7–35.3)
MCHC RBC AUTO-ENTMCNC: 33 G/DL (ref 33.7–35.3)
MCHC RBC AUTO-ENTMCNC: 33.8 G/DL (ref 33.7–35.3)
MCHC RBC AUTO-ENTMCNC: 33.8 G/DL (ref 33.7–35.3)
MCHC RBC AUTO-ENTMCNC: 34 G/DL (ref 33.7–35.3)
MCHC RBC AUTO-ENTMCNC: 34.2 G/DL (ref 33.7–35.3)
MCHC RBC AUTO-ENTMCNC: 34.2 G/DL (ref 33.7–35.3)
MCV RBC AUTO: 87.4 FL (ref 81.4–97.8)
MCV RBC AUTO: 89.1 FL (ref 81.4–97.8)
MCV RBC AUTO: 89.3 FL (ref 81.4–97.8)
MCV RBC AUTO: 89.9 FL (ref 81.4–97.8)
MCV RBC AUTO: 91.9 FL (ref 81.4–97.8)
MCV RBC AUTO: 92.1 FL (ref 81.4–97.8)
MCV RBC AUTO: 92.2 FL (ref 81.4–97.8)
MCV RBC AUTO: 92.3 FL (ref 81.4–97.8)
MCV RBC AUTO: 92.5 FL (ref 81.4–97.8)
MCV RBC AUTO: 92.5 FL (ref 81.4–97.8)
MCV RBC AUTO: 92.7 FL (ref 81.4–97.8)
MCV RBC AUTO: 98.9 FL (ref 81.4–97.8)
METAMYELOCYTES NFR BLD MANUAL: 13.8 %
METAMYELOCYTES NFR BLD MANUAL: 6.2 %
METAMYELOCYTES NFR BLD MANUAL: 9.7 %
METHADONE UR QL SCN: NEGATIVE
METHADONE UR QL SCN: NEGATIVE
MICRO URNS: ABNORMAL
MICROCYTES BLD QL SMEAR: ABNORMAL
MONOCYTES # BLD AUTO: 0 K/UL (ref 0–0.85)
MONOCYTES # BLD AUTO: 0.16 K/UL (ref 0–0.85)
MONOCYTES # BLD AUTO: 0.17 K/UL (ref 0–0.85)
MONOCYTES # BLD AUTO: 0.18 K/UL (ref 0–0.85)
MONOCYTES # BLD AUTO: 0.27 K/UL (ref 0–0.85)
MONOCYTES # BLD AUTO: 0.31 K/UL (ref 0–0.85)
MONOCYTES # BLD AUTO: 0.48 K/UL (ref 0–0.85)
MONOCYTES # BLD AUTO: 0.56 K/UL (ref 0–0.85)
MONOCYTES # BLD AUTO: 0.69 K/UL (ref 0–0.85)
MONOCYTES NFR BLD AUTO: 0 % (ref 0–13.4)
MONOCYTES NFR BLD AUTO: 0.9 % (ref 0–13.4)
MONOCYTES NFR BLD AUTO: 1 % (ref 0–13.4)
MONOCYTES NFR BLD AUTO: 1.1 % (ref 0–13.4)
MONOCYTES NFR BLD AUTO: 1.7 % (ref 0–13.4)
MONOCYTES NFR BLD AUTO: 1.9 % (ref 0–13.4)
MONOCYTES NFR BLD AUTO: 3.4 % (ref 0–13.4)
MONOCYTES NFR BLD AUTO: 4.4 % (ref 0–13.4)
MONOCYTES NFR BLD AUTO: 5.3 % (ref 0–13.4)
MORPHOLOGY BLD-IMP: NORMAL
MRSA DNA SPEC QL NAA+PROBE: NORMAL
MYELOCYTES NFR BLD MANUAL: 0.9 %
MYELOCYTES NFR BLD MANUAL: 0.9 %
MYELOCYTES NFR BLD MANUAL: 2.6 %
NEUTROPHILS # BLD AUTO: 1.89 K/UL (ref 1.82–7.42)
NEUTROPHILS # BLD AUTO: 11.59 K/UL (ref 1.82–7.42)
NEUTROPHILS # BLD AUTO: 12.92 K/UL (ref 1.82–7.42)
NEUTROPHILS # BLD AUTO: 13.38 K/UL (ref 1.82–7.42)
NEUTROPHILS # BLD AUTO: 13.51 K/UL (ref 1.82–7.42)
NEUTROPHILS # BLD AUTO: 13.68 K/UL (ref 1.82–7.42)
NEUTROPHILS # BLD AUTO: 13.68 K/UL (ref 1.82–7.42)
NEUTROPHILS # BLD AUTO: 14.03 K/UL (ref 1.82–7.42)
NEUTROPHILS # BLD AUTO: 14.15 K/UL (ref 1.82–7.42)
NEUTROPHILS # BLD AUTO: 14.66 K/UL (ref 1.82–7.42)
NEUTROPHILS # BLD AUTO: 18.87 K/UL (ref 1.82–7.42)
NEUTROPHILS # BLD AUTO: 25.2 K/UL (ref 1.82–7.42)
NEUTROPHILS NFR BLD: 16.8 % (ref 44–72)
NEUTROPHILS NFR BLD: 52.6 % (ref 44–72)
NEUTROPHILS NFR BLD: 58.9 % (ref 44–72)
NEUTROPHILS NFR BLD: 88.5 % (ref 44–72)
NEUTROPHILS NFR BLD: 89 % (ref 44–72)
NEUTROPHILS NFR BLD: 89.6 % (ref 44–72)
NEUTROPHILS NFR BLD: 90.4 % (ref 44–72)
NEUTROPHILS NFR BLD: 90.8 % (ref 44–72)
NEUTROPHILS NFR BLD: 92.1 % (ref 44–72)
NEUTROPHILS NFR BLD: 92.2 % (ref 44–72)
NEUTROPHILS NFR BLD: 95.6 % (ref 44–72)
NEUTROPHILS NFR BLD: 96.5 % (ref 44–72)
NEUTS BAND NFR BLD MANUAL: 0.9 % (ref 0–10)
NEUTS BAND NFR BLD MANUAL: 16.1 % (ref 0–10)
NEUTS BAND NFR BLD MANUAL: 23.3 % (ref 0–10)
NEUTS BAND NFR BLD MANUAL: 3.5 % (ref 0–10)
NEUTS BAND NFR BLD MANUAL: 5 % (ref 0–10)
NEUTS BAND NFR BLD MANUAL: 5.2 % (ref 0–10)
NEUTS BAND NFR BLD MANUAL: 9.8 % (ref 0–10)
NITRITE UR QL STRIP.AUTO: NEGATIVE
NRBC # BLD AUTO: 0 K/UL
NRBC # BLD AUTO: 0.02 K/UL
NRBC # BLD AUTO: 0.02 K/UL
NRBC # BLD AUTO: 0.03 K/UL
NRBC # BLD AUTO: 0.05 K/UL
NRBC # BLD AUTO: 0.07 K/UL
NRBC BLD-RTO: 0 /100 WBC
NRBC BLD-RTO: 0.1 /100 WBC
NRBC BLD-RTO: 0.1 /100 WBC
NRBC BLD-RTO: 0.2 /100 WBC
NRBC BLD-RTO: 0.2 /100 WBC
NRBC BLD-RTO: 1 /100 WBC
NT-PROBNP SERPL IA-MCNC: 1090 PG/ML (ref 0–125)
NT-PROBNP SERPL IA-MCNC: 1297 PG/ML (ref 0–125)
NT-PROBNP SERPL IA-MCNC: 1405 PG/ML (ref 0–125)
O2/TOTAL GAS SETTING VFR VENT: 100 %
O2/TOTAL GAS SETTING VFR VENT: 100 % (ref 30–60)
O2/TOTAL GAS SETTING VFR VENT: 40 %
O2/TOTAL GAS SETTING VFR VENT: 40 %
O2/TOTAL GAS SETTING VFR VENT: 60 %
OPIATES UR QL SCN: POSITIVE
OPIATES UR QL SCN: POSITIVE
OVALOCYTES BLD QL SMEAR: NORMAL
OXYCODONE UR QL SCN: NEGATIVE
OXYCODONE UR QL SCN: NEGATIVE
PCO2 BLDA: 32.7 MMHG (ref 26–37)
PCO2 BLDA: 35.3 MMHG (ref 26–37)
PCO2 BLDA: 38.5 MMHG (ref 26–37)
PCO2 BLDA: 39 MMHG (ref 26–37)
PCO2 BLDA: 39.1 MMHG (ref 26–37)
PCO2 BLDA: 39.6 MMHG (ref 26–37)
PCO2 BLDA: 40 MMHG (ref 26–37)
PCO2 BLDA: 40.3 MMHG (ref 26–37)
PCO2 BLDA: 40.5 MMHG (ref 26–37)
PCO2 BLDA: 41.4 MMHG (ref 26–37)
PCO2 BLDA: 41.4 MMHG (ref 26–37)
PCO2 BLDA: 41.9 MMHG (ref 26–37)
PCO2 BLDA: 42.1 MMHG (ref 26–37)
PCO2 BLDA: 43.9 MMHG (ref 26–37)
PCO2 BLDA: 44.1 MMHG (ref 26–37)
PCO2 BLDA: 46.8 MMHG (ref 26–37)
PCO2 BLDA: 51.5 MMHG (ref 26–37)
PCO2 BLDA: 54.1 MMHG (ref 26–37)
PCO2 BLDA: 55.5 MMHG (ref 26–37)
PCO2 BLDA: 74.9 MMHG (ref 26–37)
PCO2 BLDA: 84.8 MMHG (ref 26–37)
PCO2 BLDA: >100 MMHG (ref 26–37)
PCO2 TEMP ADJ BLDA: 35.3 MMHG (ref 26–37)
PCO2 TEMP ADJ BLDA: 37.4 MMHG (ref 26–37)
PCO2 TEMP ADJ BLDA: 38.8 MMHG (ref 26–37)
PCO2 TEMP ADJ BLDA: 38.9 MMHG (ref 26–37)
PCO2 TEMP ADJ BLDA: 39.1 MMHG (ref 26–37)
PCO2 TEMP ADJ BLDA: 42.2 MMHG (ref 26–37)
PCO2 TEMP ADJ BLDA: 43 MMHG (ref 26–37)
PCO2 TEMP ADJ BLDA: 43.5 MMHG (ref 26–37)
PCO2 TEMP ADJ BLDA: 45.6 MMHG (ref 26–37)
PCO2 TEMP ADJ BLDA: 50.4 MMHG (ref 26–37)
PCO2 TEMP ADJ BLDA: 51.7 MMHG (ref 26–37)
PCO2 TEMP ADJ BLDA: 51.9 MMHG (ref 26–37)
PCO2 TEMP ADJ BLDA: 72 MMHG (ref 26–37)
PCO2 TEMP ADJ BLDA: ABNORMAL MMHG (ref 26–37)
PCP UR QL SCN: NEGATIVE
PCP UR QL SCN: NEGATIVE
PH BLDA: 6.74 [PH] (ref 7.4–7.5)
PH BLDA: 6.77 [PH] (ref 7.4–7.5)
PH BLDA: 7.07 [PH] (ref 7.4–7.5)
PH BLDA: 7.07 [PH] (ref 7.4–7.5)
PH BLDA: 7.17 [PH] (ref 7.4–7.5)
PH BLDA: 7.29 [PH] (ref 7.4–7.5)
PH BLDA: 7.32 [PH] (ref 7.4–7.5)
PH BLDA: 7.36 [PH] (ref 7.4–7.5)
PH BLDA: 7.38 [PH] (ref 7.4–7.5)
PH BLDA: 7.39 [PH] (ref 7.4–7.5)
PH BLDA: 7.41 [PH] (ref 7.4–7.5)
PH BLDA: 7.45 [PH] (ref 7.4–7.5)
PH BLDA: 7.46 [PH] (ref 7.4–7.5)
PH BLDA: 7.47 [PH] (ref 7.4–7.5)
PH BLDA: 7.48 [PH] (ref 7.4–7.5)
PH BLDA: 7.5 [PH] (ref 7.4–7.5)
PH BLDA: 7.51 [PH] (ref 7.4–7.5)
PH BLDA: 7.52 [PH] (ref 7.4–7.5)
PH BLDA: 7.52 [PH] (ref 7.4–7.5)
PH BLDA: 7.54 [PH] (ref 7.4–7.5)
PH TEMP ADJ BLDA: 6.78 [PH] (ref 7.4–7.5)
PH TEMP ADJ BLDA: 7.09 [PH] (ref 7.4–7.5)
PH TEMP ADJ BLDA: 7.09 [PH] (ref 7.4–7.5)
PH TEMP ADJ BLDA: 7.18 [PH] (ref 7.4–7.5)
PH TEMP ADJ BLDA: 7.31 [PH] (ref 7.4–7.5)
PH TEMP ADJ BLDA: 7.31 [PH] (ref 7.4–7.5)
PH TEMP ADJ BLDA: 7.36 [PH] (ref 7.4–7.5)
PH TEMP ADJ BLDA: 7.39 [PH] (ref 7.4–7.5)
PH TEMP ADJ BLDA: 7.4 [PH] (ref 7.4–7.5)
PH TEMP ADJ BLDA: 7.44 [PH] (ref 7.4–7.5)
PH TEMP ADJ BLDA: 7.48 [PH] (ref 7.4–7.5)
PH TEMP ADJ BLDA: 7.51 [PH] (ref 7.4–7.5)
PH TEMP ADJ BLDA: 7.51 [PH] (ref 7.4–7.5)
PH TEMP ADJ BLDA: 7.53 [PH] (ref 7.4–7.5)
PH UR STRIP.AUTO: 5 [PH] (ref 5–8)
PH UR STRIP.AUTO: 5.5 [PH] (ref 5–8)
PH UR STRIP.AUTO: 7.5 [PH] (ref 5–8)
PH UR STRIP.AUTO: 7.5 [PH] (ref 5–8)
PH UR STRIP.AUTO: 8.5 [PH] (ref 5–8)
PHOSPHATE SERPL-MCNC: 1.9 MG/DL (ref 2.5–4.5)
PHOSPHATE SERPL-MCNC: 11.7 MG/DL (ref 2.5–4.5)
PHOSPHATE SERPL-MCNC: 2.1 MG/DL (ref 2.5–4.5)
PHOSPHATE SERPL-MCNC: 2.2 MG/DL (ref 2.5–4.5)
PHOSPHATE SERPL-MCNC: 2.2 MG/DL (ref 2.5–4.5)
PHOSPHATE SERPL-MCNC: 2.3 MG/DL (ref 2.5–4.5)
PHOSPHATE SERPL-MCNC: 2.3 MG/DL (ref 2.5–4.5)
PHOSPHATE SERPL-MCNC: 2.8 MG/DL (ref 2.5–4.5)
PHOSPHATE SERPL-MCNC: 2.9 MG/DL (ref 2.5–4.5)
PHOSPHATE SERPL-MCNC: 3.2 MG/DL (ref 2.5–4.5)
PHOSPHATE SERPL-MCNC: 3.3 MG/DL (ref 2.5–4.5)
PHOSPHATE SERPL-MCNC: NORMAL MG/DL (ref 2.5–4.5)
PLATELET # BLD AUTO: 102 K/UL (ref 164–446)
PLATELET # BLD AUTO: 103 K/UL (ref 164–446)
PLATELET # BLD AUTO: 104 K/UL (ref 164–446)
PLATELET # BLD AUTO: 107 K/UL (ref 164–446)
PLATELET # BLD AUTO: 108 K/UL (ref 164–446)
PLATELET # BLD AUTO: 113 K/UL (ref 164–446)
PLATELET # BLD AUTO: 126 K/UL (ref 164–446)
PLATELET # BLD AUTO: 130 K/UL (ref 164–446)
PLATELET # BLD AUTO: 138 K/UL (ref 164–446)
PLATELET # BLD AUTO: 274 K/UL (ref 164–446)
PLATELET # BLD AUTO: 293 K/UL (ref 164–446)
PLATELET # BLD AUTO: 98 K/UL (ref 164–446)
PLATELET BLD QL SMEAR: NORMAL
PMV BLD AUTO: 11.4 FL (ref 9–12.9)
PMV BLD AUTO: 11.4 FL (ref 9–12.9)
PMV BLD AUTO: 11.6 FL (ref 9–12.9)
PMV BLD AUTO: 11.8 FL (ref 9–12.9)
PMV BLD AUTO: 11.9 FL (ref 9–12.9)
PMV BLD AUTO: 12.5 FL (ref 9–12.9)
PO2 BLDA: 112.3 MMHG (ref 64–87)
PO2 BLDA: 132.5 MMHG (ref 64–87)
PO2 BLDA: 139.6 MMHG (ref 64–87)
PO2 BLDA: 147 MMHG (ref 64–87)
PO2 BLDA: 148 MMHG (ref 64–87)
PO2 BLDA: 157.5 MMHG (ref 64–87)
PO2 BLDA: 159 MMHG (ref 64–87)
PO2 BLDA: 195.6 MMHG (ref 64–87)
PO2 BLDA: 196 MMHG (ref 64–87)
PO2 BLDA: 214.6 MMHG (ref 64–87)
PO2 BLDA: 365.3 MMHG (ref 64–87)
PO2 BLDA: 400 MMHG (ref 64–87)
PO2 BLDA: 431 MMHG (ref 64–87)
PO2 BLDA: 465 MMHG (ref 64–87)
PO2 BLDA: 62 MMHG (ref 64–87)
PO2 BLDA: 64 MMHG (ref 64–87)
PO2 BLDA: 70.5 MMHG (ref 64–87)
PO2 BLDA: 74.8 MMHG (ref 64–87)
PO2 BLDA: 86 MMHG (ref 64–87)
PO2 BLDA: 91.6 MMHG (ref 64–87)
PO2 BLDA: 97.6 MMHG (ref 64–87)
PO2 BLDA: 99.3 MMHG (ref 64–87)
PO2 TEMP ADJ BLDA: 112.9 MMHG (ref 64–87)
PO2 TEMP ADJ BLDA: 142 MMHG (ref 64–87)
PO2 TEMP ADJ BLDA: 147 MMHG (ref 64–87)
PO2 TEMP ADJ BLDA: 156 MMHG (ref 64–87)
PO2 TEMP ADJ BLDA: 198 MMHG (ref 64–87)
PO2 TEMP ADJ BLDA: 396.6 MMHG (ref 64–87)
PO2 TEMP ADJ BLDA: 425 MMHG (ref 64–87)
PO2 TEMP ADJ BLDA: 459 MMHG (ref 64–87)
PO2 TEMP ADJ BLDA: 56 MMHG (ref 64–87)
PO2 TEMP ADJ BLDA: 57 MMHG (ref 64–87)
PO2 TEMP ADJ BLDA: 68.2 MMHG (ref 64–87)
PO2 TEMP ADJ BLDA: 84 MMHG (ref 64–87)
PO2 TEMP ADJ BLDA: 87.5 MMHG (ref 64–87)
PO2 TEMP ADJ BLDA: 98.1 MMHG (ref 64–87)
POIKILOCYTOSIS BLD QL SMEAR: NORMAL
POIKILOCYTOSIS BLD QL SMEAR: NORMAL
POTASSIUM BLD-SCNC: 4.8 MMOL/L (ref 3.6–5.5)
POTASSIUM SERPL-SCNC: 3.3 MMOL/L (ref 3.6–5.5)
POTASSIUM SERPL-SCNC: 3.4 MMOL/L (ref 3.6–5.5)
POTASSIUM SERPL-SCNC: 3.5 MMOL/L (ref 3.6–5.5)
POTASSIUM SERPL-SCNC: 3.6 MMOL/L (ref 3.6–5.5)
POTASSIUM SERPL-SCNC: 3.7 MMOL/L (ref 3.6–5.5)
POTASSIUM SERPL-SCNC: 3.7 MMOL/L (ref 3.6–5.5)
POTASSIUM SERPL-SCNC: 3.9 MMOL/L (ref 3.6–5.5)
POTASSIUM SERPL-SCNC: 4 MMOL/L (ref 3.6–5.5)
POTASSIUM SERPL-SCNC: 4.1 MMOL/L (ref 3.6–5.5)
POTASSIUM SERPL-SCNC: 4.1 MMOL/L (ref 3.6–5.5)
POTASSIUM SERPL-SCNC: 4.4 MMOL/L (ref 3.6–5.5)
POTASSIUM SERPL-SCNC: 4.5 MMOL/L (ref 3.6–5.5)
POTASSIUM SERPL-SCNC: 4.8 MMOL/L (ref 3.6–5.5)
POTASSIUM SERPL-SCNC: 5.6 MMOL/L (ref 3.6–5.5)
POTASSIUM SERPL-SCNC: NORMAL MMOL/L (ref 3.6–5.5)
PROCALCITONIN SERPL-MCNC: 42.23 NG/ML
PROCALCITONIN SERPL-MCNC: 57.92 NG/ML
PROPOXYPH UR QL SCN: NEGATIVE
PROPOXYPH UR QL SCN: NEGATIVE
PROT SERPL-MCNC: 4.6 G/DL (ref 6–8.2)
PROT SERPL-MCNC: 4.7 G/DL (ref 6–8.2)
PROT SERPL-MCNC: 4.7 G/DL (ref 6–8.2)
PROT SERPL-MCNC: 4.8 G/DL (ref 6–8.2)
PROT SERPL-MCNC: 4.9 G/DL (ref 6–8.2)
PROT SERPL-MCNC: 4.9 G/DL (ref 6–8.2)
PROT SERPL-MCNC: 5.1 G/DL (ref 6–8.2)
PROT SERPL-MCNC: 5.3 G/DL (ref 6–8.2)
PROT SERPL-MCNC: NORMAL G/DL (ref 6–8.2)
PROT UR QL STRIP: 30 MG/DL
PROT UR QL STRIP: NEGATIVE MG/DL
PROT UR QL STRIP: NEGATIVE MG/DL
PROTHROMBIN TIME: 15.3 SEC (ref 12–14.6)
PROTHROMBIN TIME: 16.3 SEC (ref 12–14.6)
PROTHROMBIN TIME: 16.4 SEC (ref 12–14.6)
PROTHROMBIN TIME: 16.5 SEC (ref 12–14.6)
PROTHROMBIN TIME: 16.7 SEC (ref 12–14.6)
PROTHROMBIN TIME: 16.7 SEC (ref 12–14.6)
PROTHROMBIN TIME: 17.9 SEC (ref 12–14.6)
PROTHROMBIN TIME: 18.6 SEC (ref 12–14.6)
PROTHROMBIN TIME: 20.3 SEC (ref 12–14.6)
RBC # BLD AUTO: 3.18 M/UL (ref 4.7–6.1)
RBC # BLD AUTO: 3.21 M/UL (ref 4.7–6.1)
RBC # BLD AUTO: 3.3 M/UL (ref 4.7–6.1)
RBC # BLD AUTO: 3.35 M/UL (ref 4.7–6.1)
RBC # BLD AUTO: 3.37 M/UL (ref 4.7–6.1)
RBC # BLD AUTO: 3.46 M/UL (ref 4.7–6.1)
RBC # BLD AUTO: 3.64 M/UL (ref 4.7–6.1)
RBC # BLD AUTO: 3.77 M/UL (ref 4.7–6.1)
RBC # BLD AUTO: 3.86 M/UL (ref 4.7–6.1)
RBC # BLD AUTO: 4.05 M/UL (ref 4.7–6.1)
RBC # BLD AUTO: 4.66 M/UL (ref 4.7–6.1)
RBC # BLD AUTO: 4.87 M/UL (ref 4.7–6.1)
RBC # FLD: NORMAL CELLS/UL
RBC # URNS HPF: ABNORMAL /HPF
RBC BLD AUTO: PRESENT
RBC UR QL AUTO: ABNORMAL
RBC UR QL AUTO: NEGATIVE
RBC UR QL AUTO: NEGATIVE
RH BLD: NORMAL
RHODAMINE-AURAMINE STN SPEC: NORMAL
SALICYLATES SERPL-MCNC: 0 MG/DL (ref 15–25)
SAO2 % BLDA: 100 % (ref 93–99)
SAO2 % BLDA: 80 % (ref 93–99)
SAO2 % BLDA: 81 % (ref 93–99)
SAO2 % BLDA: 86.7 % (ref 93–99)
SAO2 % BLDA: 94.1 % (ref 93–99)
SAO2 % BLDA: 95.5 % (ref 93–99)
SAO2 % BLDA: 96.8 % (ref 93–99)
SAO2 % BLDA: 97.2 % (ref 93–99)
SAO2 % BLDA: 97.2 % (ref 93–99)
SAO2 % BLDA: 98.1 % (ref 93–99)
SAO2 % BLDA: 98.4 % (ref 93–99)
SAO2 % BLDA: 98.5 % (ref 93–99)
SAO2 % BLDA: 98.6 % (ref 93–99)
SAO2 % BLDA: 98.8 % (ref 93–99)
SAO2 % BLDA: 99 % (ref 93–99)
SAO2 % BLDA: 99.1 % (ref 93–99)
SAO2 % BLDA: 99.2 % (ref 93–99)
SAO2 % BLDA: ABNORMAL % (ref 93–99)
SIGNIFICANT IND 70042: ABNORMAL
SIGNIFICANT IND 70042: ABNORMAL
SIGNIFICANT IND 70042: NORMAL
SITE SITE: ABNORMAL
SITE SITE: ABNORMAL
SITE SITE: NORMAL
SMUDGE CELLS BLD QL SMEAR: NORMAL
SMUDGE CELLS BLD QL SMEAR: NORMAL
SODIUM BLD-SCNC: 137 MMOL/L (ref 135–145)
SODIUM SERPL-SCNC: 134 MMOL/L (ref 135–145)
SODIUM SERPL-SCNC: 136 MMOL/L (ref 135–145)
SODIUM SERPL-SCNC: 137 MMOL/L (ref 135–145)
SODIUM SERPL-SCNC: 137 MMOL/L (ref 135–145)
SODIUM SERPL-SCNC: 139 MMOL/L (ref 135–145)
SODIUM SERPL-SCNC: 139 MMOL/L (ref 135–145)
SODIUM SERPL-SCNC: 145 MMOL/L (ref 135–145)
SODIUM SERPL-SCNC: 145 MMOL/L (ref 135–145)
SODIUM SERPL-SCNC: 147 MMOL/L (ref 135–145)
SODIUM SERPL-SCNC: 147 MMOL/L (ref 135–145)
SODIUM SERPL-SCNC: 148 MMOL/L (ref 135–145)
SODIUM SERPL-SCNC: 149 MMOL/L (ref 135–145)
SODIUM SERPL-SCNC: 150 MMOL/L (ref 135–145)
SODIUM SERPL-SCNC: 150 MMOL/L (ref 135–145)
SODIUM SERPL-SCNC: 151 MMOL/L (ref 135–145)
SODIUM SERPL-SCNC: 151 MMOL/L (ref 135–145)
SODIUM SERPL-SCNC: 152 MMOL/L (ref 135–145)
SODIUM SERPL-SCNC: 152 MMOL/L (ref 135–145)
SODIUM SERPL-SCNC: 156 MMOL/L (ref 135–145)
SODIUM SERPL-SCNC: NORMAL MMOL/L (ref 135–145)
SOURCE SOURCE: ABNORMAL
SOURCE SOURCE: ABNORMAL
SOURCE SOURCE: NORMAL
SP GR UR STRIP.AUTO: 1
SP GR UR STRIP.AUTO: 1.01
SP GR UR STRIP.AUTO: 1.01
SP GR UR STRIP.AUTO: 1.03
SPECIMEN DRAWN FROM PATIENT: ABNORMAL
TRIGL SERPL-MCNC: 138 MG/DL (ref 0–149)
TRIGL SERPL-MCNC: 166 MG/DL (ref 0–149)
TROPONIN T SERPL-MCNC: 115 NG/L (ref 6–19)
TROPONIN T SERPL-MCNC: 120 NG/L (ref 6–19)
TROPONIN T SERPL-MCNC: 128 NG/L (ref 6–19)
TROPONIN T SERPL-MCNC: 132 NG/L (ref 6–19)
TROPONIN T SERPL-MCNC: 136 NG/L (ref 6–19)
TROPONIN T SERPL-MCNC: 32 NG/L (ref 6–19)
TROPONIN T SERPL-MCNC: 33 NG/L (ref 6–19)
TROPONIN T SERPL-MCNC: 33 NG/L (ref 6–19)
TROPONIN T SERPL-MCNC: 34 NG/L (ref 6–19)
TROPONIN T SERPL-MCNC: 36 NG/L (ref 6–19)
TROPONIN T SERPL-MCNC: 40 NG/L (ref 6–19)
TROPONIN T SERPL-MCNC: 41 NG/L (ref 6–19)
TROPONIN T SERPL-MCNC: 44 NG/L (ref 6–19)
TROPONIN T SERPL-MCNC: 65 NG/L (ref 6–19)
TROPONIN T SERPL-MCNC: 69 NG/L (ref 6–19)
TROPONIN T SERPL-MCNC: 97 NG/L (ref 6–19)
UROBILINOGEN UR STRIP.AUTO-MCNC: 0.2 MG/DL
VANCOMYCIN SERPL-MCNC: 16.6 UG/ML
VANCOMYCIN SERPL-MCNC: 17.8 UG/ML
VARIANT LYMPHS BLD QL SMEAR: NORMAL
WBC # BLD AUTO: 13.1 K/UL (ref 4.8–10.8)
WBC # BLD AUTO: 13.8 K/UL (ref 4.8–10.8)
WBC # BLD AUTO: 14 K/UL (ref 4.8–10.8)
WBC # BLD AUTO: 14.2 K/UL (ref 4.8–10.8)
WBC # BLD AUTO: 14.5 K/UL (ref 4.8–10.8)
WBC # BLD AUTO: 15.1 K/UL (ref 4.8–10.8)
WBC # BLD AUTO: 15.2 K/UL (ref 4.8–10.8)
WBC # BLD AUTO: 15.6 K/UL (ref 4.8–10.8)
WBC # BLD AUTO: 18.7 K/UL (ref 4.8–10.8)
WBC # BLD AUTO: 19.9 K/UL (ref 4.8–10.8)
WBC # BLD AUTO: 33.2 K/UL (ref 4.8–10.8)
WBC # BLD AUTO: 7.1 K/UL (ref 4.8–10.8)
WBC # FLD: NORMAL CELLS/UL
WBC #/AREA URNS HPF: ABNORMAL /HPF

## 2019-01-01 PROCEDURE — 700102 HCHG RX REV CODE 250 W/ 637 OVERRIDE(OP): Performed by: INTERNAL MEDICINE

## 2019-01-01 PROCEDURE — 80074 ACUTE HEPATITIS PANEL: CPT

## 2019-01-01 PROCEDURE — 82962 GLUCOSE BLOOD TEST: CPT | Mod: 91

## 2019-01-01 PROCEDURE — 82550 ASSAY OF CK (CPK): CPT

## 2019-01-01 PROCEDURE — 80307 DRUG TEST PRSMV CHEM ANLYZR: CPT

## 2019-01-01 PROCEDURE — C9113 INJ PANTOPRAZOLE SODIUM, VIA: HCPCS

## 2019-01-01 PROCEDURE — 87205 SMEAR GRAM STAIN: CPT | Mod: 91

## 2019-01-01 PROCEDURE — 85027 COMPLETE CBC AUTOMATED: CPT

## 2019-01-01 PROCEDURE — 82375 ASSAY CARBOXYHB QUANT: CPT

## 2019-01-01 PROCEDURE — A9270 NON-COVERED ITEM OR SERVICE: HCPCS | Performed by: INTERNAL MEDICINE

## 2019-01-01 PROCEDURE — 700111 HCHG RX REV CODE 636 W/ 250 OVERRIDE (IP): Performed by: PSYCHIATRY & NEUROLOGY

## 2019-01-01 PROCEDURE — 700101 HCHG RX REV CODE 250

## 2019-01-01 PROCEDURE — 81001 URINALYSIS AUTO W/SCOPE: CPT

## 2019-01-01 PROCEDURE — 94640 AIRWAY INHALATION TREATMENT: CPT

## 2019-01-01 PROCEDURE — 71045 X-RAY EXAM CHEST 1 VIEW: CPT

## 2019-01-01 PROCEDURE — 96368 THER/DIAG CONCURRENT INF: CPT

## 2019-01-01 PROCEDURE — 70450 CT HEAD/BRAIN W/O DYE: CPT

## 2019-01-01 PROCEDURE — 4A00X4Z MEASUREMENT OF CENTRAL NERVOUS ELECTRICAL ACTIVITY, EXTERNAL APPROACH: ICD-10-PCS | Performed by: PSYCHIATRY & NEUROLOGY

## 2019-01-01 PROCEDURE — 160039 HCHG SURGERY MINUTES - EA ADDL 1 MIN LEVEL 3

## 2019-01-01 PROCEDURE — 82977 ASSAY OF GGT: CPT

## 2019-01-01 PROCEDURE — 700101 HCHG RX REV CODE 250: Performed by: INTERNAL MEDICINE

## 2019-01-01 PROCEDURE — 83605 ASSAY OF LACTIC ACID: CPT

## 2019-01-01 PROCEDURE — 93010 ELECTROCARDIOGRAM REPORT: CPT | Performed by: INTERNAL MEDICINE

## 2019-01-01 PROCEDURE — 8915 PR COMPREHENSIVE PHYSICAL: Performed by: PHYSICIAN ASSISTANT

## 2019-01-01 PROCEDURE — 83605 ASSAY OF LACTIC ACID: CPT | Mod: 91

## 2019-01-01 PROCEDURE — 700105 HCHG RX REV CODE 258: Performed by: INTERNAL MEDICINE

## 2019-01-01 PROCEDURE — 82330 ASSAY OF CALCIUM: CPT

## 2019-01-01 PROCEDURE — 99223 1ST HOSP IP/OBS HIGH 75: CPT | Performed by: INTERNAL MEDICINE

## 2019-01-01 PROCEDURE — 99291 CRITICAL CARE FIRST HOUR: CPT | Mod: 25 | Performed by: INTERNAL MEDICINE

## 2019-01-01 PROCEDURE — 700111 HCHG RX REV CODE 636 W/ 250 OVERRIDE (IP)

## 2019-01-01 PROCEDURE — 84100 ASSAY OF PHOSPHORUS: CPT

## 2019-01-01 PROCEDURE — 82803 BLOOD GASES ANY COMBINATION: CPT | Mod: 91

## 2019-01-01 PROCEDURE — 99292 CRITICAL CARE ADDL 30 MIN: CPT | Mod: 25 | Performed by: INTERNAL MEDICINE

## 2019-01-01 PROCEDURE — 99233 SBSQ HOSP IP/OBS HIGH 50: CPT | Performed by: PSYCHIATRY & NEUROLOGY

## 2019-01-01 PROCEDURE — 303274

## 2019-01-01 PROCEDURE — 84484 ASSAY OF TROPONIN QUANT: CPT

## 2019-01-01 PROCEDURE — 99291 CRITICAL CARE FIRST HOUR: CPT | Performed by: INTERNAL MEDICINE

## 2019-01-01 PROCEDURE — 94003 VENT MGMT INPAT SUBQ DAY: CPT

## 2019-01-01 PROCEDURE — 700105 HCHG RX REV CODE 258: Performed by: EMERGENCY MEDICINE

## 2019-01-01 PROCEDURE — 85025 COMPLETE CBC W/AUTO DIFF WBC: CPT | Mod: 91

## 2019-01-01 PROCEDURE — 770022 HCHG ROOM/CARE - ICU (200)

## 2019-01-01 PROCEDURE — 85730 THROMBOPLASTIN TIME PARTIAL: CPT | Mod: 91

## 2019-01-01 PROCEDURE — 85007 BL SMEAR W/DIFF WBC COUNT: CPT

## 2019-01-01 PROCEDURE — 93503 INSERT/PLACE HEART CATHETER: CPT

## 2019-01-01 PROCEDURE — 82803 BLOOD GASES ANY COMBINATION: CPT

## 2019-01-01 PROCEDURE — 36620 INSERTION CATHETER ARTERY: CPT | Performed by: INTERNAL MEDICINE

## 2019-01-01 PROCEDURE — 82150 ASSAY OF AMYLASE: CPT

## 2019-01-01 PROCEDURE — 83880 ASSAY OF NATRIURETIC PEPTIDE: CPT | Mod: 91

## 2019-01-01 PROCEDURE — 700105 HCHG RX REV CODE 258

## 2019-01-01 PROCEDURE — 84478 ASSAY OF TRIGLYCERIDES: CPT

## 2019-01-01 PROCEDURE — 95951 EEG: CPT | Performed by: PSYCHIATRY & NEUROLOGY

## 2019-01-01 PROCEDURE — 80053 COMPREHEN METABOLIC PANEL: CPT

## 2019-01-01 PROCEDURE — 83735 ASSAY OF MAGNESIUM: CPT

## 2019-01-01 PROCEDURE — 51702 INSERT TEMP BLADDER CATH: CPT

## 2019-01-01 PROCEDURE — 36556 INSERT NON-TUNNEL CV CATH: CPT | Mod: 51,LT | Performed by: INTERNAL MEDICINE

## 2019-01-01 PROCEDURE — B2111ZZ FLUOROSCOPY OF MULTIPLE CORONARY ARTERIES USING LOW OSMOLAR CONTRAST: ICD-10-PCS | Performed by: INTERNAL MEDICINE

## 2019-01-01 PROCEDURE — 700111 HCHG RX REV CODE 636 W/ 250 OVERRIDE (IP): Performed by: INTERNAL MEDICINE

## 2019-01-01 PROCEDURE — 306802 SYSTEM FECAL MANAGEMENT CATHETER KIT FLEXI - SEAL: Performed by: INTERNAL MEDICINE

## 2019-01-01 PROCEDURE — 87086 URINE CULTURE/COLONY COUNT: CPT

## 2019-01-01 PROCEDURE — 83735 ASSAY OF MAGNESIUM: CPT | Mod: 91

## 2019-01-01 PROCEDURE — 87040 BLOOD CULTURE FOR BACTERIA: CPT

## 2019-01-01 PROCEDURE — 83036 HEMOGLOBIN GLYCOSYLATED A1C: CPT

## 2019-01-01 PROCEDURE — 96367 TX/PROPH/DG ADDL SEQ IV INF: CPT

## 2019-01-01 PROCEDURE — 89051 BODY FLUID CELL COUNT: CPT

## 2019-01-01 PROCEDURE — 82330 ASSAY OF CALCIUM: CPT | Mod: 91

## 2019-01-01 PROCEDURE — 84100 ASSAY OF PHOSPHORUS: CPT | Mod: 91

## 2019-01-01 PROCEDURE — 87015 SPECIMEN INFECT AGNT CONCNTJ: CPT

## 2019-01-01 PROCEDURE — 37799 UNLISTED PX VASCULAR SURGERY: CPT

## 2019-01-01 PROCEDURE — 03HY32Z INSERTION OF MONITORING DEVICE INTO UPPER ARTERY, PERCUTANEOUS APPROACH: ICD-10-PCS | Performed by: INTERNAL MEDICINE

## 2019-01-01 PROCEDURE — 85027 COMPLETE CBC AUTOMATED: CPT | Mod: 91

## 2019-01-01 PROCEDURE — 96366 THER/PROPH/DIAG IV INF ADDON: CPT

## 2019-01-01 PROCEDURE — 302978 HCHG BRONCHOSCOPY-DIAGNOSTIC

## 2019-01-01 PROCEDURE — 80048 BASIC METABOLIC PNL TOTAL CA: CPT | Mod: 91

## 2019-01-01 PROCEDURE — 95951 EEG: CPT | Mod: 26,52 | Performed by: PSYCHIATRY & NEUROLOGY

## 2019-01-01 PROCEDURE — 700102 HCHG RX REV CODE 250 W/ 637 OVERRIDE(OP)

## 2019-01-01 PROCEDURE — 80202 ASSAY OF VANCOMYCIN: CPT

## 2019-01-01 PROCEDURE — 700101 HCHG RX REV CODE 250: Performed by: EMERGENCY MEDICINE

## 2019-01-01 PROCEDURE — 500847

## 2019-01-01 PROCEDURE — 93005 ELECTROCARDIOGRAM TRACING: CPT

## 2019-01-01 PROCEDURE — 700111 HCHG RX REV CODE 636 W/ 250 OVERRIDE (IP): Performed by: EMERGENCY MEDICINE

## 2019-01-01 PROCEDURE — C1751 CATH, INF, PER/CENT/MIDLINE: HCPCS

## 2019-01-01 PROCEDURE — 85610 PROTHROMBIN TIME: CPT

## 2019-01-01 PROCEDURE — 99291 CRITICAL CARE FIRST HOUR: CPT

## 2019-01-01 PROCEDURE — 81001 URINALYSIS AUTO W/SCOPE: CPT | Mod: 91

## 2019-01-01 PROCEDURE — 86850 RBC ANTIBODY SCREEN: CPT

## 2019-01-01 PROCEDURE — 87389 HIV-1 AG W/HIV-1&-2 AB AG IA: CPT

## 2019-01-01 PROCEDURE — 86713 LEGIONELLA ANTIBODY: CPT

## 2019-01-01 PROCEDURE — 94002 VENT MGMT INPAT INIT DAY: CPT

## 2019-01-01 PROCEDURE — 85025 COMPLETE CBC W/AUTO DIFF WBC: CPT

## 2019-01-01 PROCEDURE — 501452 HCHG STAPLES, GIA MULTIFIRE 60/80

## 2019-01-01 PROCEDURE — 86900 BLOOD TYPING SEROLOGIC ABO: CPT

## 2019-01-01 PROCEDURE — 84484 ASSAY OF TROPONIN QUANT: CPT | Mod: 91

## 2019-01-01 PROCEDURE — 99254 IP/OBS CNSLTJ NEW/EST MOD 60: CPT | Mod: 25 | Performed by: PSYCHIATRY & NEUROLOGY

## 2019-01-01 PROCEDURE — 304561 HCHG STAT O2

## 2019-01-01 PROCEDURE — 86631 CHLAMYDIA ANTIBODY: CPT | Mod: 91

## 2019-01-01 PROCEDURE — 93306 TTE W/DOPPLER COMPLETE: CPT | Mod: 26 | Performed by: INTERNAL MEDICINE

## 2019-01-01 PROCEDURE — 501433 HCHG STAPLER, GIA MULTIFIRE 60/80

## 2019-01-01 PROCEDURE — 0B9F8ZX DRAINAGE OF RIGHT LOWER LUNG LOBE, VIA NATURAL OR ARTIFICIAL OPENING ENDOSCOPIC, DIAGNOSTIC: ICD-10-PCS | Performed by: INTERNAL MEDICINE

## 2019-01-01 PROCEDURE — 303105 HCHG CATHETER EXTRA

## 2019-01-01 PROCEDURE — 93306 TTE W/DOPPLER COMPLETE: CPT

## 2019-01-01 PROCEDURE — 70551 MRI BRAIN STEM W/O DYE: CPT

## 2019-01-01 PROCEDURE — 84295 ASSAY OF SERUM SODIUM: CPT

## 2019-01-01 PROCEDURE — 85007 BL SMEAR W/DIFF WBC COUNT: CPT | Mod: 91

## 2019-01-01 PROCEDURE — 82553 CREATINE MB FRACTION: CPT | Mod: 91

## 2019-01-01 PROCEDURE — 93456 R HRT CORONARY ARTERY ANGIO: CPT | Mod: 26 | Performed by: INTERNAL MEDICINE

## 2019-01-01 PROCEDURE — 700105 HCHG RX REV CODE 258: Performed by: PSYCHIATRY & NEUROLOGY

## 2019-01-01 PROCEDURE — 84145 PROCALCITONIN (PCT): CPT

## 2019-01-01 PROCEDURE — 99238 HOSP IP/OBS DSCHRG MGMT 30/<: CPT | Performed by: INTERNAL MEDICINE

## 2019-01-01 PROCEDURE — 700117 HCHG RX CONTRAST REV CODE 255

## 2019-01-01 PROCEDURE — 82248 BILIRUBIN DIRECT: CPT

## 2019-01-01 PROCEDURE — B548ZZA ULTRASONOGRAPHY OF SUPERIOR VENA CAVA, GUIDANCE: ICD-10-PCS | Performed by: INTERNAL MEDICINE

## 2019-01-01 PROCEDURE — 160028 HCHG SURGERY MINUTES - 1ST 30 MINS LEVEL 3

## 2019-01-01 PROCEDURE — 80048 BASIC METABOLIC PNL TOTAL CA: CPT

## 2019-01-01 PROCEDURE — 502704 HCHG DEVICE, LIGASURE IMPACT

## 2019-01-01 PROCEDURE — 86901 BLOOD TYPING SEROLOGIC RH(D): CPT

## 2019-01-01 PROCEDURE — 501443 HCHG STAPLER, ROTIC. FLEX

## 2019-01-01 PROCEDURE — 71275 CT ANGIOGRAPHY CHEST: CPT

## 2019-01-01 PROCEDURE — 36556 INSERT NON-TUNNEL CV CATH: CPT

## 2019-01-01 PROCEDURE — 501838 HCHG SUTURE GENERAL

## 2019-01-01 PROCEDURE — 87798 DETECT AGENT NOS DNA AMP: CPT

## 2019-01-01 PROCEDURE — 76775 US EXAM ABDO BACK WALL LIM: CPT

## 2019-01-01 PROCEDURE — 82962 GLUCOSE BLOOD TEST: CPT

## 2019-01-01 PROCEDURE — 82553 CREATINE MB FRACTION: CPT

## 2019-01-01 PROCEDURE — 87206 SMEAR FLUORESCENT/ACID STAI: CPT

## 2019-01-01 PROCEDURE — 4A023N6 MEASUREMENT OF CARDIAC SAMPLING AND PRESSURE, RIGHT HEART, PERCUTANEOUS APPROACH: ICD-10-PCS | Performed by: INTERNAL MEDICINE

## 2019-01-01 PROCEDURE — 31624 DX BRONCHOSCOPE/LAVAGE: CPT | Mod: 51 | Performed by: INTERNAL MEDICINE

## 2019-01-01 PROCEDURE — 76705 ECHO EXAM OF ABDOMEN: CPT

## 2019-01-01 PROCEDURE — 501837 HCHG SUTURE CV

## 2019-01-01 PROCEDURE — 86359 T CELLS TOTAL COUNT: CPT

## 2019-01-01 PROCEDURE — 160048 HCHG OR STATISTICAL LEVEL 1-5

## 2019-01-01 PROCEDURE — 82248 BILIRUBIN DIRECT: CPT | Mod: 91

## 2019-01-01 PROCEDURE — 160009 HCHG ANES TIME/MIN

## 2019-01-01 PROCEDURE — 36620 INSERTION CATHETER ARTERY: CPT

## 2019-01-01 PROCEDURE — 36600 WITHDRAWAL OF ARTERIAL BLOOD: CPT

## 2019-01-01 PROCEDURE — 87502 INFLUENZA DNA AMP PROBE: CPT

## 2019-01-01 PROCEDURE — 99292 CRITICAL CARE ADDL 30 MIN: CPT | Performed by: INTERNAL MEDICINE

## 2019-01-01 PROCEDURE — 87281 PNEUMOCYSTIS CARINII AG IF: CPT

## 2019-01-01 PROCEDURE — A9270 NON-COVERED ITEM OR SERVICE: HCPCS

## 2019-01-01 PROCEDURE — 81002 URINALYSIS NONAUTO W/O SCOPE: CPT

## 2019-01-01 PROCEDURE — 02HV33Z INSERTION OF INFUSION DEVICE INTO SUPERIOR VENA CAVA, PERCUTANEOUS APPROACH: ICD-10-PCS | Performed by: INTERNAL MEDICINE

## 2019-01-01 PROCEDURE — 87102 FUNGUS ISOLATION CULTURE: CPT

## 2019-01-01 PROCEDURE — 83690 ASSAY OF LIPASE: CPT

## 2019-01-01 PROCEDURE — 700101 HCHG RX REV CODE 250: Performed by: PSYCHIATRY & NEUROLOGY

## 2019-01-01 PROCEDURE — 87077 CULTURE AEROBIC IDENTIFY: CPT

## 2019-01-01 PROCEDURE — 85610 PROTHROMBIN TIME: CPT | Mod: 91

## 2019-01-01 PROCEDURE — 700117 HCHG RX CONTRAST REV CODE 255: Performed by: EMERGENCY MEDICINE

## 2019-01-01 PROCEDURE — 82550 ASSAY OF CK (CPK): CPT | Mod: 91

## 2019-01-01 PROCEDURE — 96365 THER/PROPH/DIAG IV INF INIT: CPT

## 2019-01-01 PROCEDURE — 87070 CULTURE OTHR SPECIMN AEROBIC: CPT

## 2019-01-01 PROCEDURE — 502652 HCHG STAPLES, ETHICON ECR60

## 2019-01-01 PROCEDURE — 302132 K THERMIA MOTOR: Performed by: INTERNAL MEDICINE

## 2019-01-01 PROCEDURE — 99153 MOD SED SAME PHYS/QHP EA: CPT

## 2019-01-01 PROCEDURE — 86738 MYCOPLASMA ANTIBODY: CPT

## 2019-01-01 PROCEDURE — 85384 FIBRINOGEN ACTIVITY: CPT

## 2019-01-01 PROCEDURE — 82977 ASSAY OF GGT: CPT | Mod: 91

## 2019-01-01 PROCEDURE — 85730 THROMBOPLASTIN TIME PARTIAL: CPT

## 2019-01-01 PROCEDURE — 84132 ASSAY OF SERUM POTASSIUM: CPT

## 2019-01-01 PROCEDURE — 31645 BRNCHSC W/THER ASPIR 1ST: CPT | Performed by: INTERNAL MEDICINE

## 2019-01-01 PROCEDURE — 87641 MR-STAPH DNA AMP PROBE: CPT

## 2019-01-01 PROCEDURE — 93005 ELECTROCARDIOGRAM TRACING: CPT | Performed by: EMERGENCY MEDICINE

## 2019-01-01 PROCEDURE — 83880 ASSAY OF NATRIURETIC PEPTIDE: CPT

## 2019-01-01 PROCEDURE — 86360 T CELL ABSOLUTE COUNT/RATIO: CPT

## 2019-01-01 PROCEDURE — 85014 HEMATOCRIT: CPT

## 2019-01-01 PROCEDURE — 87116 MYCOBACTERIA CULTURE: CPT

## 2019-01-01 PROCEDURE — 80053 COMPREHEN METABOLIC PANEL: CPT | Mod: 91

## 2019-01-01 PROCEDURE — 5A1945Z RESPIRATORY VENTILATION, 24-96 CONSECUTIVE HOURS: ICD-10-PCS | Performed by: INTERNAL MEDICINE

## 2019-01-01 RX ORDER — PANTOPRAZOLE SODIUM 40 MG/10ML
40 INJECTION, POWDER, LYOPHILIZED, FOR SOLUTION INTRAVENOUS DAILY
Status: DISCONTINUED | OUTPATIENT
Start: 2019-01-01 | End: 2019-12-10 | Stop reason: HOSPADM

## 2019-01-01 RX ORDER — SODIUM CHLORIDE, SODIUM LACTATE, POTASSIUM CHLORIDE, AND CALCIUM CHLORIDE .6; .31; .03; .02 G/100ML; G/100ML; G/100ML; G/100ML
1000 INJECTION, SOLUTION INTRAVENOUS ONCE
Status: COMPLETED | OUTPATIENT
Start: 2019-01-01 | End: 2019-01-01

## 2019-01-01 RX ORDER — HEPARIN SODIUM 5000 [USP'U]/ML
5000 INJECTION, SOLUTION INTRAVENOUS; SUBCUTANEOUS EVERY 8 HOURS
Status: DISCONTINUED | OUTPATIENT
Start: 2019-01-01 | End: 2019-01-01 | Stop reason: HOSPADM

## 2019-01-01 RX ORDER — AMOXICILLIN 250 MG
2 CAPSULE ORAL 2 TIMES DAILY
Status: DISCONTINUED | OUTPATIENT
Start: 2019-01-01 | End: 2019-01-01 | Stop reason: HOSPADM

## 2019-01-01 RX ORDER — OSELTAMIVIR PHOSPHATE 75 MG/1
75 CAPSULE ORAL EVERY 12 HOURS
Status: DISCONTINUED | OUTPATIENT
Start: 2019-01-01 | End: 2019-12-10 | Stop reason: HOSPADM

## 2019-01-01 RX ORDER — DEXTROSE MONOHYDRATE 100 MG/ML
INJECTION, SOLUTION INTRAVENOUS CONTINUOUS
Status: DISCONTINUED | OUTPATIENT
Start: 2019-01-01 | End: 2019-01-01

## 2019-01-01 RX ORDER — DEXMEDETOMIDINE HYDROCHLORIDE 4 UG/ML
0-1.5 INJECTION, SOLUTION INTRAVENOUS CONTINUOUS
Status: DISCONTINUED | OUTPATIENT
Start: 2019-01-01 | End: 2019-01-01

## 2019-01-01 RX ORDER — LIDOCAINE HYDROCHLORIDE 20 MG/ML
INJECTION, SOLUTION INFILTRATION; PERINEURAL
Status: COMPLETED
Start: 2019-01-01 | End: 2019-01-01

## 2019-01-01 RX ORDER — SODIUM CHLORIDE, SODIUM LACTATE, POTASSIUM CHLORIDE, CALCIUM CHLORIDE 600; 310; 30; 20 MG/100ML; MG/100ML; MG/100ML; MG/100ML
INJECTION, SOLUTION INTRAVENOUS CONTINUOUS
Status: DISCONTINUED | OUTPATIENT
Start: 2019-01-01 | End: 2019-01-01

## 2019-01-01 RX ORDER — LABETALOL HYDROCHLORIDE 5 MG/ML
10 INJECTION, SOLUTION INTRAVENOUS ONCE
Status: COMPLETED | OUTPATIENT
Start: 2019-01-01 | End: 2019-01-01

## 2019-01-01 RX ORDER — MAGNESIUM SULFATE HEPTAHYDRATE 40 MG/ML
4 INJECTION, SOLUTION INTRAVENOUS EVERY 6 HOURS PRN
Status: DISCONTINUED | OUTPATIENT
Start: 2019-01-01 | End: 2019-12-10 | Stop reason: HOSPADM

## 2019-01-01 RX ORDER — IPRATROPIUM BROMIDE AND ALBUTEROL SULFATE 2.5; .5 MG/3ML; MG/3ML
3 SOLUTION RESPIRATORY (INHALATION)
Status: DISCONTINUED | OUTPATIENT
Start: 2019-01-01 | End: 2019-01-01 | Stop reason: HOSPADM

## 2019-01-01 RX ORDER — SODIUM CHLORIDE 9 MG/ML
INJECTION, SOLUTION INTRAVENOUS
Status: COMPLETED
Start: 2019-01-01 | End: 2019-01-01

## 2019-01-01 RX ORDER — VECURONIUM BROMIDE 1 MG/ML
0.1 INJECTION, POWDER, LYOPHILIZED, FOR SOLUTION INTRAVENOUS ONCE
Status: COMPLETED | OUTPATIENT
Start: 2019-01-01 | End: 2019-01-01

## 2019-01-01 RX ORDER — VECURONIUM BROMIDE 1 MG/ML
10 INJECTION, POWDER, LYOPHILIZED, FOR SOLUTION INTRAVENOUS ONCE
Status: COMPLETED | OUTPATIENT
Start: 2019-01-01 | End: 2019-01-01

## 2019-01-01 RX ORDER — 3% SODIUM CHLORIDE 3 G/100ML
500 INJECTION, SOLUTION INTRAVENOUS CONTINUOUS
Status: DISCONTINUED | OUTPATIENT
Start: 2019-01-01 | End: 2019-01-01 | Stop reason: HOSPADM

## 2019-01-01 RX ORDER — MANNITOL 250 MG/ML
25 INJECTION, SOLUTION INTRAVENOUS ONCE
Status: COMPLETED | OUTPATIENT
Start: 2019-01-01 | End: 2019-01-01

## 2019-01-01 RX ORDER — FUROSEMIDE 10 MG/ML
INJECTION INTRAMUSCULAR; INTRAVENOUS
Status: ACTIVE
Start: 2019-01-01 | End: 2019-01-01

## 2019-01-01 RX ORDER — SODIUM CHLORIDE, SODIUM LACTATE, POTASSIUM CHLORIDE, AND CALCIUM CHLORIDE .6; .31; .03; .02 G/100ML; G/100ML; G/100ML; G/100ML
1000 INJECTION, SOLUTION INTRAVENOUS
Status: COMPLETED | OUTPATIENT
Start: 2019-01-01 | End: 2019-01-01

## 2019-01-01 RX ORDER — PHENYLEPHRINE HCL IN 0.9% NACL 0.5 MG/5ML
SYRINGE (ML) INTRAVENOUS
Status: ACTIVE
Start: 2019-01-01 | End: 2019-01-01

## 2019-01-01 RX ORDER — POLYETHYLENE GLYCOL 3350 17 G/17G
1 POWDER, FOR SOLUTION ORAL
Status: DISCONTINUED | OUTPATIENT
Start: 2019-01-01 | End: 2019-01-01 | Stop reason: HOSPADM

## 2019-01-01 RX ORDER — OSELTAMIVIR PHOSPHATE 30 MG/1
30 CAPSULE ORAL 2 TIMES DAILY
Status: DISCONTINUED | OUTPATIENT
Start: 2019-01-01 | End: 2019-01-01

## 2019-01-01 RX ORDER — FUROSEMIDE 10 MG/ML
20 INJECTION INTRAMUSCULAR; INTRAVENOUS ONCE
Status: COMPLETED | OUTPATIENT
Start: 2019-01-01 | End: 2019-01-01

## 2019-01-01 RX ORDER — MANNITOL 250 MG/ML
75 INJECTION, SOLUTION INTRAVENOUS ONCE
Status: DISCONTINUED | OUTPATIENT
Start: 2019-01-01 | End: 2019-01-01

## 2019-01-01 RX ORDER — LORAZEPAM 2 MG/ML
2 INJECTION INTRAMUSCULAR EVERY 4 HOURS PRN
Status: DISCONTINUED | OUTPATIENT
Start: 2019-01-01 | End: 2019-01-01 | Stop reason: HOSPADM

## 2019-01-01 RX ORDER — SODIUM CHLORIDE, SODIUM LACTATE, POTASSIUM CHLORIDE, AND CALCIUM CHLORIDE .6; .31; .03; .02 G/100ML; G/100ML; G/100ML; G/100ML
30 INJECTION, SOLUTION INTRAVENOUS
Status: DISCONTINUED | OUTPATIENT
Start: 2019-01-01 | End: 2019-01-01 | Stop reason: HOSPADM

## 2019-01-01 RX ORDER — DEXTROSE MONOHYDRATE 50 MG/ML
INJECTION, SOLUTION INTRAVENOUS ONCE
Status: COMPLETED | OUTPATIENT
Start: 2019-01-01 | End: 2019-01-01

## 2019-01-01 RX ORDER — POTASSIUM CHLORIDE 14.9 MG/ML
20 INJECTION INTRAVENOUS EVERY 6 HOURS PRN
Status: DISCONTINUED | OUTPATIENT
Start: 2019-01-01 | End: 2019-12-10 | Stop reason: HOSPADM

## 2019-01-01 RX ORDER — ACETAMINOPHEN 325 MG/1
650 TABLET ORAL EVERY 6 HOURS PRN
Status: DISCONTINUED | OUTPATIENT
Start: 2019-01-01 | End: 2019-01-01 | Stop reason: HOSPADM

## 2019-01-01 RX ORDER — OSELTAMIVIR PHOSPHATE 75 MG/1
75 CAPSULE ORAL 2 TIMES DAILY
Status: DISCONTINUED | OUTPATIENT
Start: 2019-01-01 | End: 2019-01-01

## 2019-01-01 RX ORDER — MAGNESIUM SULFATE HEPTAHYDRATE 40 MG/ML
2 INJECTION, SOLUTION INTRAVENOUS EVERY 6 HOURS PRN
Status: DISCONTINUED | OUTPATIENT
Start: 2019-01-01 | End: 2019-12-10 | Stop reason: HOSPADM

## 2019-01-01 RX ORDER — ESMOLOL HYDROCHLORIDE 10 MG/ML
500 INJECTION INTRAVENOUS PRN
Status: DISCONTINUED | OUTPATIENT
Start: 2019-01-01 | End: 2019-12-10 | Stop reason: HOSPADM

## 2019-01-01 RX ORDER — HEPARIN SODIUM 200 [USP'U]/100ML
INJECTION, SOLUTION INTRAVENOUS
Status: COMPLETED
Start: 2019-01-01 | End: 2019-01-01

## 2019-01-01 RX ORDER — HEPARIN SODIUM,PORCINE 1000/ML
VIAL (ML) INJECTION
Status: COMPLETED
Start: 2019-01-01 | End: 2019-01-01

## 2019-01-01 RX ORDER — ESMOLOL HYDROCHLORIDE 20 MG/ML
0-200 INJECTION, SOLUTION INTRAVENOUS CONTINUOUS
Status: ACTIVE | OUTPATIENT
Start: 2019-01-01 | End: 2019-01-01

## 2019-01-01 RX ORDER — OSELTAMIVIR PHOSPHATE 75 MG/1
75 CAPSULE ORAL 2 TIMES DAILY
Status: DISCONTINUED | OUTPATIENT
Start: 2019-01-01 | End: 2019-01-01 | Stop reason: HOSPADM

## 2019-01-01 RX ORDER — POTASSIUM CHLORIDE 29.8 MG/ML
40 INJECTION INTRAVENOUS EVERY 6 HOURS PRN
Status: DISCONTINUED | OUTPATIENT
Start: 2019-01-01 | End: 2019-12-10 | Stop reason: HOSPADM

## 2019-01-01 RX ORDER — BISACODYL 10 MG
10 SUPPOSITORY, RECTAL RECTAL
Status: DISCONTINUED | OUTPATIENT
Start: 2019-01-01 | End: 2019-01-01 | Stop reason: HOSPADM

## 2019-01-01 RX ORDER — FAMOTIDINE 20 MG/1
20 TABLET, FILM COATED ORAL DAILY
Status: DISCONTINUED | OUTPATIENT
Start: 2019-01-01 | End: 2019-01-01 | Stop reason: HOSPADM

## 2019-01-01 RX ORDER — POLYVINYL ALCOHOL 14 MG/ML
2 SOLUTION/ DROPS OPHTHALMIC
Status: DISCONTINUED | OUTPATIENT
Start: 2019-01-01 | End: 2019-12-10 | Stop reason: HOSPADM

## 2019-01-01 RX ORDER — LABETALOL HYDROCHLORIDE 5 MG/ML
10 INJECTION, SOLUTION INTRAVENOUS EVERY 4 HOURS PRN
Status: DISCONTINUED | OUTPATIENT
Start: 2019-01-01 | End: 2019-01-01 | Stop reason: HOSPADM

## 2019-01-01 RX ORDER — SODIUM CHLORIDE 9 MG/ML
1000 INJECTION, SOLUTION INTRAVENOUS ONCE
Status: COMPLETED | OUTPATIENT
Start: 2019-01-01 | End: 2019-01-01

## 2019-01-01 RX ORDER — VECURONIUM BROMIDE 1 MG/ML
0.1 INJECTION, POWDER, LYOPHILIZED, FOR SOLUTION INTRAVENOUS
Status: DISCONTINUED | OUTPATIENT
Start: 2019-01-01 | End: 2019-01-01

## 2019-01-01 RX ADMIN — HEPARIN SODIUM 5000 UNITS: 5000 INJECTION, SOLUTION INTRAVENOUS; SUBCUTANEOUS at 21:11

## 2019-01-01 RX ADMIN — LEVOTHYROXINE SODIUM ANHYDROUS 20 MCG: 100 INJECTION, POWDER, LYOPHILIZED, FOR SOLUTION INTRAVENOUS at 16:38

## 2019-01-01 RX ADMIN — SODIUM CHLORIDE 500 ML: 9 INJECTION, SOLUTION INTRAVENOUS at 21:00

## 2019-01-01 RX ADMIN — OSELTAMIVIR PHOSPHATE 30 MG: 30 CAPSULE ORAL at 14:59

## 2019-01-01 RX ADMIN — DEXMEDETOMIDINE HYDROCHLORIDE 1 MCG/KG/HR: 100 INJECTION, SOLUTION INTRAVENOUS at 11:08

## 2019-01-01 RX ADMIN — SODIUM CHLORIDE: 9 INJECTION, SOLUTION INTRAVENOUS at 06:15

## 2019-01-01 RX ADMIN — HEPARIN SODIUM 5000 UNITS: 5000 INJECTION, SOLUTION INTRAVENOUS; SUBCUTANEOUS at 14:59

## 2019-01-01 RX ADMIN — HEPARIN SODIUM 5000 UNITS: 5000 INJECTION, SOLUTION INTRAVENOUS; SUBCUTANEOUS at 04:41

## 2019-01-01 RX ADMIN — PIPERACILLIN AND TAZOBACTAM 4.5 G: 4; .5 INJECTION, POWDER, LYOPHILIZED, FOR SOLUTION INTRAVENOUS; PARENTERAL at 04:35

## 2019-01-01 RX ADMIN — POLYVINYL ALCOHOL 2 DROP: 14 SOLUTION/ DROPS OPHTHALMIC at 16:02

## 2019-01-01 RX ADMIN — FENTANYL CITRATE 100 MCG: 50 INJECTION, SOLUTION INTRAMUSCULAR; INTRAVENOUS at 17:36

## 2019-01-01 RX ADMIN — VANCOMYCIN HYDROCHLORIDE 1800 MG: 500 INJECTION, POWDER, LYOPHILIZED, FOR SOLUTION INTRAVENOUS at 06:19

## 2019-01-01 RX ADMIN — SODIUM CHLORIDE 1000 MG: 9 INJECTION, SOLUTION INTRAVENOUS at 08:14

## 2019-01-01 RX ADMIN — SENNOSIDES AND DOCUSATE SODIUM 2 TABLET: 8.6; 5 TABLET ORAL at 06:00

## 2019-01-01 RX ADMIN — POLYVINYL ALCOHOL 2 DROP: 14 SOLUTION/ DROPS OPHTHALMIC at 06:20

## 2019-01-01 RX ADMIN — SODIUM BICARBONATE 150 MEQ: 84 INJECTION, SOLUTION INTRAVENOUS at 19:43

## 2019-01-01 RX ADMIN — ALBUTEROL SULFATE 2.5 MG: 2.5 SOLUTION RESPIRATORY (INHALATION) at 22:37

## 2019-01-01 RX ADMIN — INSULIN HUMAN 1 UNITS: 100 INJECTION, SOLUTION PARENTERAL at 23:55

## 2019-01-01 RX ADMIN — DOXYCYCLINE 100 MG: 100 INJECTION, POWDER, LYOPHILIZED, FOR SOLUTION INTRAVENOUS at 16:56

## 2019-01-01 RX ADMIN — DEXTROSE MONOHYDRATE 5 MG/HR: 50 INJECTION, SOLUTION INTRAVENOUS at 17:07

## 2019-01-01 RX ADMIN — SODIUM CHLORIDE 1000 MG: 9 INJECTION, SOLUTION INTRAVENOUS at 04:43

## 2019-01-01 RX ADMIN — INSULIN HUMAN 4 UNITS: 100 INJECTION, SOLUTION PARENTERAL at 17:13

## 2019-01-01 RX ADMIN — DEXTROSE MONOHYDRATE 5 MG/HR: 50 INJECTION, SOLUTION INTRAVENOUS at 08:31

## 2019-01-01 RX ADMIN — POTASSIUM CHLORIDE: 149 INJECTION, SOLUTION, CONCENTRATE INTRAVENOUS at 10:44

## 2019-01-01 RX ADMIN — VANCOMYCIN HYDROCHLORIDE 2000 MG: 500 INJECTION, POWDER, LYOPHILIZED, FOR SOLUTION INTRAVENOUS at 11:40

## 2019-01-01 RX ADMIN — PANTOPRAZOLE SODIUM 40 MG: 40 INJECTION, POWDER, LYOPHILIZED, FOR SOLUTION INTRAVENOUS at 05:29

## 2019-01-01 RX ADMIN — OSELTAMIVIR PHOSPHATE 75 MG: 75 CAPSULE ORAL at 05:29

## 2019-01-01 RX ADMIN — LEVOTHYROXINE SODIUM ANHYDROUS 10 MCG/HR: 100 INJECTION, POWDER, LYOPHILIZED, FOR SOLUTION INTRAVENOUS at 16:37

## 2019-01-01 RX ADMIN — POLYVINYL ALCOHOL 2 DROP: 14 SOLUTION/ DROPS OPHTHALMIC at 10:02

## 2019-01-01 RX ADMIN — SODIUM CHLORIDE 500 ML: 9 INJECTION, SOLUTION INTRAVENOUS at 22:00

## 2019-01-01 RX ADMIN — MANNITOL 25 G: 12.5 INJECTION, SOLUTION INTRAVENOUS at 17:14

## 2019-01-01 RX ADMIN — DEXTROSE 250 ML: 10 SOLUTION INTRAVENOUS at 12:56

## 2019-01-01 RX ADMIN — ALBUTEROL SULFATE 2.5 MG: 2.5 SOLUTION RESPIRATORY (INHALATION) at 05:38

## 2019-01-01 RX ADMIN — POLYVINYL ALCOHOL 2 DROP: 14 SOLUTION/ DROPS OPHTHALMIC at 04:18

## 2019-01-01 RX ADMIN — SODIUM CHLORIDE, POTASSIUM CHLORIDE, SODIUM LACTATE AND CALCIUM CHLORIDE 1000 ML: 600; 310; 30; 20 INJECTION, SOLUTION INTRAVENOUS at 16:26

## 2019-01-01 RX ADMIN — INSULIN HUMAN 4 UNITS: 100 INJECTION, SOLUTION PARENTERAL at 16:02

## 2019-01-01 RX ADMIN — SODIUM CHLORIDE 500 ML: 234 INJECTION, SOLUTION INTRAVENOUS at 08:28

## 2019-01-01 RX ADMIN — SODIUM CHLORIDE 1000 ML: 9 INJECTION, SOLUTION INTRAVENOUS at 10:24

## 2019-01-01 RX ADMIN — SODIUM CHLORIDE 4 UNITS/HR: 9 INJECTION, SOLUTION INTRAVENOUS at 02:10

## 2019-01-01 RX ADMIN — IOHEXOL 30 ML: 350 INJECTION, SOLUTION INTRAVENOUS at 21:21

## 2019-01-01 RX ADMIN — MAGNESIUM SULFATE HEPTAHYDRATE 6 G: 500 INJECTION, SOLUTION INTRAMUSCULAR; INTRAVENOUS at 07:45

## 2019-01-01 RX ADMIN — PANTOPRAZOLE SODIUM 40 MG: 40 INJECTION, POWDER, LYOPHILIZED, FOR SOLUTION INTRAVENOUS at 16:37

## 2019-01-01 RX ADMIN — POLYVINYL ALCOHOL 2 DROP: 14 SOLUTION/ DROPS OPHTHALMIC at 08:16

## 2019-01-01 RX ADMIN — POLYVINYL ALCOHOL 2 DROP: 14 SOLUTION/ DROPS OPHTHALMIC at 02:13

## 2019-01-01 RX ADMIN — HEPARIN SODIUM 2000 UNITS: 200 INJECTION, SOLUTION INTRAVENOUS at 21:05

## 2019-01-01 RX ADMIN — HEPARIN SODIUM: 1000 INJECTION, SOLUTION INTRAVENOUS; SUBCUTANEOUS at 21:03

## 2019-01-01 RX ADMIN — OSELTAMIVIR PHOSPHATE 75 MG: 75 CAPSULE ORAL at 18:29

## 2019-01-01 RX ADMIN — PIPERACILLIN AND TAZOBACTAM 4.5 G: 4; .5 INJECTION, POWDER, LYOPHILIZED, FOR SOLUTION INTRAVENOUS; PARENTERAL at 11:23

## 2019-01-01 RX ADMIN — POLYVINYL ALCOHOL 2 DROP: 14 SOLUTION/ DROPS OPHTHALMIC at 00:08

## 2019-01-01 RX ADMIN — FENTANYL CITRATE 100 MCG: 0.05 INJECTION, SOLUTION INTRAMUSCULAR; INTRAVENOUS at 18:24

## 2019-01-01 RX ADMIN — ALBUTEROL SULFATE 2.5 MG: 2.5 SOLUTION RESPIRATORY (INHALATION) at 18:48

## 2019-01-01 RX ADMIN — VECURONIUM BROMIDE 0.1 MCG/KG/MIN: 1 INJECTION, POWDER, LYOPHILIZED, FOR SOLUTION INTRAVENOUS at 14:27

## 2019-01-01 RX ADMIN — SODIUM CHLORIDE, POTASSIUM CHLORIDE, SODIUM LACTATE AND CALCIUM CHLORIDE 1000 ML: 600; 310; 30; 20 INJECTION, SOLUTION INTRAVENOUS at 00:48

## 2019-01-01 RX ADMIN — AZITHROMYCIN MONOHYDRATE 500 MG: 500 INJECTION, POWDER, LYOPHILIZED, FOR SOLUTION INTRAVENOUS at 05:47

## 2019-01-01 RX ADMIN — PIPERACILLIN AND TAZOBACTAM 4.5 G: 4; .5 INJECTION, POWDER, LYOPHILIZED, FOR SOLUTION INTRAVENOUS; PARENTERAL at 21:11

## 2019-01-01 RX ADMIN — CALCIUM GLUCONATE 2 G: 98 INJECTION, SOLUTION INTRAVENOUS at 07:45

## 2019-01-01 RX ADMIN — INSULIN HUMAN 3 UNITS: 100 INJECTION, SOLUTION PARENTERAL at 14:10

## 2019-01-01 RX ADMIN — PROPOFOL 5 MCG/KG/MIN: 10 INJECTION, EMULSION INTRAVENOUS at 09:29

## 2019-01-01 RX ADMIN — INSULIN HUMAN 2 UNITS: 100 INJECTION, SOLUTION PARENTERAL at 03:17

## 2019-01-01 RX ADMIN — POTASSIUM PHOSPHATE, MONOBASIC AND POTASSIUM PHOSPHATE, DIBASIC 20 MMOL: 224; 236 INJECTION, SOLUTION, CONCENTRATE INTRAVENOUS at 23:11

## 2019-01-01 RX ADMIN — FUROSEMIDE 20 MG: 10 INJECTION, SOLUTION INTRAMUSCULAR; INTRAVENOUS at 06:30

## 2019-01-01 RX ADMIN — INSULIN HUMAN 3 UNITS: 100 INJECTION, SOLUTION PARENTERAL at 02:17

## 2019-01-01 RX ADMIN — DEXTROSE MONOHYDRATE 5 MG/HR: 50 INJECTION, SOLUTION INTRAVENOUS at 22:29

## 2019-01-01 RX ADMIN — PIPERACILLIN AND TAZOBACTAM 3.38 G: 3; .375 INJECTION, POWDER, LYOPHILIZED, FOR SOLUTION INTRAVENOUS; PARENTERAL at 13:07

## 2019-01-01 RX ADMIN — DEXTROSE MONOHYDRATE 5 MG/HR: 50 INJECTION, SOLUTION INTRAVENOUS at 03:37

## 2019-01-01 RX ADMIN — MANNITOL 25 G: 12.5 INJECTION, SOLUTION INTRAVENOUS at 17:15

## 2019-01-01 RX ADMIN — ALBUTEROL SULFATE 2.5 MG: 2.5 SOLUTION RESPIRATORY (INHALATION) at 14:33

## 2019-01-01 RX ADMIN — ALBUTEROL SULFATE 2.5 MG: 2.5 SOLUTION RESPIRATORY (INHALATION) at 22:48

## 2019-01-01 RX ADMIN — PROPOFOL 30 MCG/KG/MIN: 10 INJECTION, EMULSION INTRAVENOUS at 08:08

## 2019-01-01 RX ADMIN — HEPARIN SODIUM 5000 UNITS: 5000 INJECTION, SOLUTION INTRAVENOUS; SUBCUTANEOUS at 04:59

## 2019-01-01 RX ADMIN — PROPOFOL 30 MCG/KG/MIN: 10 INJECTION, EMULSION INTRAVENOUS at 15:49

## 2019-01-01 RX ADMIN — INSULIN HUMAN 3 UNITS: 100 INJECTION, SOLUTION PARENTERAL at 05:07

## 2019-01-01 RX ADMIN — SODIUM CHLORIDE 1000 MG: 9 INJECTION, SOLUTION INTRAVENOUS at 12:00

## 2019-01-01 RX ADMIN — PIPERACILLIN AND TAZOBACTAM 3.38 G: 3; .375 INJECTION, POWDER, LYOPHILIZED, FOR SOLUTION INTRAVENOUS; PARENTERAL at 22:03

## 2019-01-01 RX ADMIN — DEXTROSE MONOHYDRATE: 50 INJECTION, SOLUTION INTRAVENOUS at 00:05

## 2019-01-01 RX ADMIN — HYDROCORTISONE SODIUM SUCCINATE 50 MG: 100 INJECTION, POWDER, FOR SOLUTION INTRAMUSCULAR; INTRAVENOUS at 12:13

## 2019-01-01 RX ADMIN — PANTOPRAZOLE SODIUM 40 MG: 40 INJECTION, POWDER, LYOPHILIZED, FOR SOLUTION INTRAVENOUS at 06:23

## 2019-01-01 RX ADMIN — OSELTAMIVIR PHOSPHATE 30 MG: 30 CAPSULE ORAL at 23:40

## 2019-01-01 RX ADMIN — DEXTROSE MONOHYDRATE 5 MG/HR: 50 INJECTION, SOLUTION INTRAVENOUS at 04:46

## 2019-01-01 RX ADMIN — CALCIUM GLUCONATE 2 G: 98 INJECTION, SOLUTION INTRAVENOUS at 21:24

## 2019-01-01 RX ADMIN — POLYVINYL ALCOHOL 2 DROP: 14 SOLUTION/ DROPS OPHTHALMIC at 14:15

## 2019-01-01 RX ADMIN — POLYVINYL ALCOHOL 2 DROP: 14 SOLUTION/ DROPS OPHTHALMIC at 12:24

## 2019-01-01 RX ADMIN — SODIUM CHLORIDE: 9 INJECTION, SOLUTION INTRAVENOUS at 13:00

## 2019-01-01 RX ADMIN — MINERAL OIL, PETROLATUM 1 APPLICATION: 425; 573 OINTMENT OPHTHALMIC at 15:49

## 2019-01-01 RX ADMIN — HYDROCORTISONE SODIUM SUCCINATE 50 MG: 100 INJECTION, POWDER, FOR SOLUTION INTRAMUSCULAR; INTRAVENOUS at 04:59

## 2019-01-01 RX ADMIN — VASOPRESSIN 0.03 UNITS/MIN: 20 INJECTION INTRAVENOUS at 15:47

## 2019-01-01 RX ADMIN — PIPERACILLIN AND TAZOBACTAM 3.38 G: 3; .375 INJECTION, POWDER, LYOPHILIZED, FOR SOLUTION INTRAVENOUS; PARENTERAL at 16:42

## 2019-01-01 RX ADMIN — MINERAL OIL, PETROLATUM 1 APPLICATION: 425; 573 OINTMENT OPHTHALMIC at 21:19

## 2019-01-01 RX ADMIN — PROPOFOL 30 MCG/KG/MIN: 10 INJECTION, EMULSION INTRAVENOUS at 17:59

## 2019-01-01 RX ADMIN — ALBUTEROL SULFATE 2.5 MG: 2.5 SOLUTION RESPIRATORY (INHALATION) at 02:35

## 2019-01-01 RX ADMIN — SODIUM BICARBONATE 150 MEQ: 84 INJECTION, SOLUTION INTRAVENOUS at 05:19

## 2019-01-01 RX ADMIN — LIDOCAINE HYDROCHLORIDE: 20 INJECTION, SOLUTION INFILTRATION; PERINEURAL at 21:03

## 2019-01-01 RX ADMIN — OSELTAMIVIR PHOSPHATE 75 MG: 75 CAPSULE ORAL at 22:55

## 2019-01-01 RX ADMIN — VECURONIUM BROMIDE 10 MG: 10 INJECTION, POWDER, LYOPHILIZED, FOR SOLUTION INTRAVENOUS at 13:46

## 2019-01-01 RX ADMIN — DEXTROSE MONOHYDRATE: 100 INJECTION, SOLUTION INTRAVENOUS at 13:43

## 2019-01-01 RX ADMIN — SODIUM CHLORIDE, POTASSIUM CHLORIDE, SODIUM LACTATE AND CALCIUM CHLORIDE 1000 ML: 600; 310; 30; 20 INJECTION, SOLUTION INTRAVENOUS at 09:30

## 2019-01-01 RX ADMIN — ALBUTEROL SULFATE 2.5 MG: 2.5 SOLUTION RESPIRATORY (INHALATION) at 10:37

## 2019-01-01 RX ADMIN — POTASSIUM CHLORIDE 20 MEQ: 14.9 INJECTION, SOLUTION INTRAVENOUS at 23:14

## 2019-01-01 RX ADMIN — FAMOTIDINE 20 MG: 20 TABLET ORAL at 04:41

## 2019-01-01 RX ADMIN — LABETALOL HYDROCHLORIDE 10 MG: 5 INJECTION, SOLUTION INTRAVENOUS at 09:20

## 2019-01-01 RX ADMIN — POLYVINYL ALCOHOL 2 DROP: 14 SOLUTION/ DROPS OPHTHALMIC at 10:14

## 2019-01-01 RX ADMIN — POLYVINYL ALCOHOL 2 DROP: 14 SOLUTION/ DROPS OPHTHALMIC at 18:05

## 2019-01-01 RX ADMIN — POLYVINYL ALCOHOL 2 DROP: 14 SOLUTION/ DROPS OPHTHALMIC at 19:53

## 2019-01-01 RX ADMIN — PIPERACILLIN AND TAZOBACTAM 3.38 G: 3; .375 INJECTION, POWDER, LYOPHILIZED, FOR SOLUTION INTRAVENOUS; PARENTERAL at 05:17

## 2019-01-01 RX ADMIN — INSULIN HUMAN 4 UNITS: 100 INJECTION, SOLUTION PARENTERAL at 06:06

## 2019-01-01 RX ADMIN — POTASSIUM CHLORIDE: 149 INJECTION, SOLUTION, CONCENTRATE INTRAVENOUS at 16:37

## 2019-01-01 RX ADMIN — SODIUM CHLORIDE, POTASSIUM CHLORIDE, SODIUM LACTATE AND CALCIUM CHLORIDE 1000 ML: 600; 310; 30; 20 INJECTION, SOLUTION INTRAVENOUS at 08:57

## 2019-01-01 RX ADMIN — SODIUM BICARBONATE 150 MEQ: 84 INJECTION, SOLUTION INTRAVENOUS at 20:27

## 2019-01-01 RX ADMIN — INSULIN HUMAN 3 UNITS: 100 INJECTION, SOLUTION PARENTERAL at 04:18

## 2019-01-01 RX ADMIN — ALBUTEROL SULFATE 2.5 MG: 2.5 SOLUTION RESPIRATORY (INHALATION) at 15:41

## 2019-01-01 RX ADMIN — ACETAMINOPHEN 650 MG: 325 TABLET, FILM COATED ORAL at 00:48

## 2019-01-01 RX ADMIN — VANCOMYCIN HYDROCHLORIDE 1600 MG: 500 INJECTION, POWDER, LYOPHILIZED, FOR SOLUTION INTRAVENOUS at 01:27

## 2019-01-01 RX ADMIN — PROPOFOL 30 MCG/KG/MIN: 10 INJECTION, EMULSION INTRAVENOUS at 14:21

## 2019-01-01 RX ADMIN — INSULIN HUMAN 3 UNITS: 100 INJECTION, SOLUTION PARENTERAL at 13:12

## 2019-01-01 RX ADMIN — PIPERACILLIN AND TAZOBACTAM 4.5 G: 4; .5 INJECTION, POWDER, LYOPHILIZED, FOR SOLUTION INTRAVENOUS; PARENTERAL at 04:29

## 2019-01-01 RX ADMIN — POLYVINYL ALCOHOL 2 DROP: 14 SOLUTION/ DROPS OPHTHALMIC at 19:54

## 2019-01-01 RX ADMIN — PIPERACILLIN AND TAZOBACTAM 3.38 G: 3; .375 INJECTION, POWDER, LYOPHILIZED, FOR SOLUTION INTRAVENOUS; PARENTERAL at 19:55

## 2019-01-01 RX ADMIN — POTASSIUM CHLORIDE 20 MEQ: 14.9 INJECTION, SOLUTION INTRAVENOUS at 17:44

## 2019-01-01 RX ADMIN — NITROGLYCERIN 10 ML: 20 INJECTION INTRAVENOUS at 21:03

## 2019-01-01 RX ADMIN — EPINEPHRINE 2 MCG/MIN: 1 INJECTION INTRAMUSCULAR; INTRAVENOUS; SUBCUTANEOUS at 09:22

## 2019-01-01 RX ADMIN — PIPERACILLIN AND TAZOBACTAM 4.5 G: 4; .5 INJECTION, POWDER, LYOPHILIZED, FOR SOLUTION INTRAVENOUS; PARENTERAL at 21:24

## 2019-01-01 RX ADMIN — DESMOPRESSIN ACETATE 2 MCG: 4 SOLUTION INTRAVENOUS at 22:08

## 2019-01-01 RX ADMIN — POLYVINYL ALCOHOL 2 DROP: 14 SOLUTION/ DROPS OPHTHALMIC at 04:16

## 2019-01-01 RX ADMIN — INSULIN HUMAN 4 UNITS: 100 INJECTION, SOLUTION PARENTERAL at 15:35

## 2019-01-01 RX ADMIN — OSELTAMIVIR PHOSPHATE 75 MG: 75 CAPSULE ORAL at 06:26

## 2019-01-01 RX ADMIN — NOREPINEPHRINE BITARTRATE 30 MCG/MIN: 1 INJECTION INTRAVENOUS at 08:56

## 2019-01-01 RX ADMIN — MINERAL OIL, PETROLATUM 1 APPLICATION: 425; 573 OINTMENT OPHTHALMIC at 23:19

## 2019-01-01 RX ADMIN — VECURONIUM BROMIDE 7.4 MG: 10 INJECTION, POWDER, LYOPHILIZED, FOR SOLUTION INTRAVENOUS at 22:32

## 2019-01-01 RX ADMIN — SODIUM CHLORIDE 10 UNITS/HR: 9 INJECTION, SOLUTION INTRAVENOUS at 19:33

## 2019-01-01 RX ADMIN — ALBUTEROL SULFATE 2.5 MG: 2.5 SOLUTION RESPIRATORY (INHALATION) at 19:10

## 2019-01-01 RX ADMIN — DEXTROSE MONOHYDRATE 5 MG/HR: 50 INJECTION, SOLUTION INTRAVENOUS at 22:33

## 2019-01-01 RX ADMIN — FAMOTIDINE 20 MG: 20 TABLET ORAL at 04:59

## 2019-01-01 RX ADMIN — IOHEXOL 75 ML: 350 INJECTION, SOLUTION INTRAVENOUS at 09:00

## 2019-01-01 RX ADMIN — POLYVINYL ALCOHOL 2 DROP: 14 SOLUTION/ DROPS OPHTHALMIC at 16:37

## 2019-01-01 RX ADMIN — EPOPROSTENOL SODIUM 0.05 MCG/KG/MIN: 1.5 INJECTION, POWDER, LYOPHILIZED, FOR SOLUTION INTRAVENOUS at 17:00

## 2019-01-01 RX ADMIN — POLYVINYL ALCOHOL 2 DROP: 14 SOLUTION/ DROPS OPHTHALMIC at 12:15

## 2019-01-01 RX ADMIN — SODIUM CHLORIDE 1000 MG: 9 INJECTION, SOLUTION INTRAVENOUS at 16:36

## 2019-01-01 RX ADMIN — PHENYLEPHRINE HYDROCHLORIDE 200 MCG/MIN: 10 INJECTION INTRAVENOUS at 01:23

## 2019-01-01 RX ADMIN — SODIUM CHLORIDE 1000 MG: 9 INJECTION, SOLUTION INTRAVENOUS at 05:29

## 2019-01-01 RX ADMIN — SODIUM CHLORIDE 500 ML: 234 INJECTION, SOLUTION INTRAVENOUS at 16:45

## 2019-01-01 RX ADMIN — LABETALOL HYDROCHLORIDE 10 MG: 5 INJECTION, SOLUTION INTRAVENOUS at 21:45

## 2019-01-01 RX ADMIN — PROPOFOL 30 MCG/KG/MIN: 10 INJECTION, EMULSION INTRAVENOUS at 01:04

## 2019-01-01 RX ADMIN — OSELTAMIVIR PHOSPHATE 75 MG: 75 CAPSULE ORAL at 21:13

## 2019-01-01 RX ADMIN — OSELTAMIVIR PHOSPHATE 75 MG: 75 CAPSULE ORAL at 10:43

## 2019-01-01 RX ADMIN — VASOPRESSIN 0.03 UNITS/MIN: 20 INJECTION INTRAVENOUS at 02:47

## 2019-01-01 RX ADMIN — INSULIN HUMAN 3 UNITS: 100 INJECTION, SOLUTION PARENTERAL at 18:07

## 2019-01-01 RX ADMIN — FENTANYL CITRATE 100 MCG: 0.05 INJECTION, SOLUTION INTRAMUSCULAR; INTRAVENOUS at 23:17

## 2019-01-01 RX ADMIN — NOREPINEPHRINE BITARTRATE 25 MCG/MIN: 1 INJECTION INTRAVENOUS at 12:58

## 2019-01-01 RX ADMIN — POTASSIUM PHOSPHATE, MONOBASIC AND POTASSIUM PHOSPHATE, DIBASIC 15 MMOL: 224; 236 INJECTION, SOLUTION, CONCENTRATE INTRAVENOUS at 18:38

## 2019-01-01 RX ADMIN — ALBUTEROL SULFATE 2.5 MG: 2.5 SOLUTION RESPIRATORY (INHALATION) at 07:17

## 2019-01-01 RX ADMIN — POLYVINYL ALCOHOL 2 DROP: 14 SOLUTION/ DROPS OPHTHALMIC at 08:15

## 2019-01-01 RX ADMIN — PHENYLEPHRINE HYDROCHLORIDE 200 MCG/MIN: 10 INJECTION INTRAVENOUS at 19:48

## 2019-01-01 RX ADMIN — POLYVINYL ALCOHOL 2 DROP: 14 SOLUTION/ DROPS OPHTHALMIC at 22:03

## 2019-01-01 RX ADMIN — DOXYCYCLINE 100 MG: 100 INJECTION, POWDER, LYOPHILIZED, FOR SOLUTION INTRAVENOUS at 04:58

## 2019-01-01 RX ADMIN — PIPERACILLIN AND TAZOBACTAM 4.5 G: 4; .5 INJECTION, POWDER, LYOPHILIZED, FOR SOLUTION INTRAVENOUS; PARENTERAL at 15:00

## 2019-01-01 RX ADMIN — EPOPROSTENOL SODIUM 0.05 MCG/KG/MIN: 1.5 INJECTION, POWDER, LYOPHILIZED, FOR SOLUTION INTRAVENOUS at 02:25

## 2019-01-01 RX ADMIN — EPOPROSTENOL SODIUM 0.05 MCG/KG/MIN: 1.5 INJECTION, POWDER, LYOPHILIZED, FOR SOLUTION INTRAVENOUS at 21:18

## 2019-01-01 RX ADMIN — POLYVINYL ALCOHOL 2 DROP: 14 SOLUTION/ DROPS OPHTHALMIC at 06:12

## 2019-01-01 RX ADMIN — SODIUM BICARBONATE 150 MEQ: 84 INJECTION, SOLUTION INTRAVENOUS at 13:45

## 2019-01-01 RX ADMIN — INSULIN HUMAN 2 UNITS: 100 INJECTION, SOLUTION PARENTERAL at 04:13

## 2019-01-01 RX ADMIN — POTASSIUM CHLORIDE: 149 INJECTION, SOLUTION, CONCENTRATE INTRAVENOUS at 15:01

## 2019-01-01 RX ADMIN — DESMOPRESSIN ACETATE 2 MCG: 4 SOLUTION INTRAVENOUS at 05:29

## 2019-01-01 RX ADMIN — INSULIN HUMAN 4 UNITS: 100 INJECTION, SOLUTION PARENTERAL at 18:05

## 2019-01-01 RX ADMIN — HUMAN ALBUMIN MICROSPHERES AND PERFLUTREN 3 ML: 10; .22 INJECTION, SOLUTION INTRAVENOUS at 08:05

## 2019-01-01 RX ADMIN — ALBUTEROL SULFATE 2.5 MG: 2.5 SOLUTION RESPIRATORY (INHALATION) at 02:27

## 2019-01-01 RX ADMIN — INSULIN HUMAN 4 UNITS: 100 INJECTION, SOLUTION PARENTERAL at 06:54

## 2019-01-01 RX ADMIN — POLYVINYL ALCOHOL 2 DROP: 14 SOLUTION/ DROPS OPHTHALMIC at 00:05

## 2019-01-01 RX ADMIN — PIPERACILLIN AND TAZOBACTAM 4.5 G: 4; .5 INJECTION, POWDER, LYOPHILIZED, FOR SOLUTION INTRAVENOUS; PARENTERAL at 15:48

## 2019-01-01 RX ADMIN — OSELTAMIVIR PHOSPHATE 75 MG: 75 CAPSULE ORAL at 12:01

## 2019-01-01 RX ADMIN — INSULIN HUMAN 2 UNITS: 100 INJECTION, SOLUTION PARENTERAL at 05:00

## 2019-01-01 RX ADMIN — ALBUTEROL SULFATE 2.5 MG: 2.5 SOLUTION RESPIRATORY (INHALATION) at 10:53

## 2019-01-01 RX ADMIN — HYDROCORTISONE SODIUM SUCCINATE 50 MG: 100 INJECTION, POWDER, FOR SOLUTION INTRAMUSCULAR; INTRAVENOUS at 16:02

## 2019-01-01 RX ADMIN — PHENYLEPHRINE HYDROCHLORIDE 50 MCG/MIN: 10 INJECTION INTRAVENOUS at 15:20

## 2019-01-01 RX ADMIN — SODIUM BICARBONATE 150 MEQ: 84 INJECTION, SOLUTION INTRAVENOUS at 06:39

## 2019-01-01 RX ADMIN — POLYVINYL ALCOHOL 2 DROP: 14 SOLUTION/ DROPS OPHTHALMIC at 02:03

## 2019-01-01 RX ADMIN — PIPERACILLIN AND TAZOBACTAM 3.38 G: 3; .375 INJECTION, POWDER, LYOPHILIZED, FOR SOLUTION INTRAVENOUS; PARENTERAL at 05:01

## 2019-01-01 RX ADMIN — POTASSIUM CHLORIDE: 149 INJECTION, SOLUTION, CONCENTRATE INTRAVENOUS at 02:32

## 2019-01-01 RX ADMIN — SODIUM CHLORIDE 1000 MG: 9 INJECTION, SOLUTION INTRAVENOUS at 17:50

## 2019-01-01 RX ADMIN — NOREPINEPHRINE BITARTRATE 30 MCG/MIN: 1 INJECTION INTRAVENOUS at 17:57

## 2019-01-01 RX ADMIN — HEPARIN SODIUM 5000 UNITS: 5000 INJECTION, SOLUTION INTRAVENOUS; SUBCUTANEOUS at 15:49

## 2019-01-01 RX ADMIN — FENTANYL CITRATE 100 MCG: 0.05 INJECTION, SOLUTION INTRAMUSCULAR; INTRAVENOUS at 06:01

## 2019-01-01 RX ADMIN — DEXTROSE MONOHYDRATE 7 MG/HR: 50 INJECTION, SOLUTION INTRAVENOUS at 09:09

## 2019-01-01 RX ADMIN — DEXTROSE MONOHYDRATE 5 MG/HR: 50 INJECTION, SOLUTION INTRAVENOUS at 23:34

## 2019-01-01 RX ADMIN — INSULIN HUMAN 2 UNITS: 100 INJECTION, SOLUTION PARENTERAL at 12:18

## 2019-01-01 RX ADMIN — POLYVINYL ALCOHOL 2 DROP: 14 SOLUTION/ DROPS OPHTHALMIC at 22:07

## 2019-01-01 RX ADMIN — SENNOSIDES AND DOCUSATE SODIUM 2 TABLET: 8.6; 5 TABLET ORAL at 04:59

## 2019-01-01 RX ADMIN — EPOPROSTENOL SODIUM 0.05 MCG/KG/MIN: 1.5 INJECTION, POWDER, LYOPHILIZED, FOR SOLUTION INTRAVENOUS at 08:17

## 2019-01-01 RX ADMIN — DOXYCYCLINE 100 MG: 100 INJECTION, POWDER, LYOPHILIZED, FOR SOLUTION INTRAVENOUS at 10:41

## 2019-01-01 RX ADMIN — DEXTROSE 250 ML: 10 SOLUTION INTRAVENOUS at 12:40

## 2019-01-01 RX ADMIN — EPOPROSTENOL SODIUM 0.05 MCG/KG/MIN: 1.5 INJECTION, POWDER, LYOPHILIZED, FOR SOLUTION INTRAVENOUS at 14:12

## 2019-01-01 RX ADMIN — MINERAL OIL, PETROLATUM 1 APPLICATION: 425; 573 OINTMENT OPHTHALMIC at 05:01

## 2019-01-01 RX ADMIN — EPOPROSTENOL SODIUM 0.05 MCG/KG/MIN: 1.5 INJECTION, POWDER, LYOPHILIZED, FOR SOLUTION INTRAVENOUS at 11:18

## 2019-01-01 RX ADMIN — HEPARIN SODIUM 5000 UNITS: 5000 INJECTION, SOLUTION INTRAVENOUS; SUBCUTANEOUS at 21:24

## 2019-01-01 RX ADMIN — HYDROCORTISONE SODIUM SUCCINATE 50 MG: 100 INJECTION, POWDER, FOR SOLUTION INTRAMUSCULAR; INTRAVENOUS at 23:34

## 2019-01-01 RX ADMIN — POLYVINYL ALCOHOL 2 DROP: 14 SOLUTION/ DROPS OPHTHALMIC at 18:55

## 2019-01-01 RX ADMIN — SODIUM BICARBONATE 150 MEQ: 84 INJECTION, SOLUTION INTRAVENOUS at 10:05

## 2019-01-01 ASSESSMENT — COGNITIVE AND FUNCTIONAL STATUS - GENERAL
HELP NEEDED FOR BATHING: TOTAL
TOILETING: TOTAL
DRESSING REGULAR UPPER BODY CLOTHING: TOTAL
CLIMB 3 TO 5 STEPS WITH RAILING: TOTAL
SUGGESTED CMS G CODE MODIFIER DAILY ACTIVITY: CN
MOVING TO AND FROM BED TO CHAIR: UNABLE
PERSONAL GROOMING: TOTAL
MOBILITY SCORE: 6
STANDING UP FROM CHAIR USING ARMS: TOTAL
TURNING FROM BACK TO SIDE WHILE IN FLAT BAD: UNABLE
DAILY ACTIVITIY SCORE: 6
MOVING FROM LYING ON BACK TO SITTING ON SIDE OF FLAT BED: UNABLE
DRESSING REGULAR LOWER BODY CLOTHING: TOTAL
WALKING IN HOSPITAL ROOM: TOTAL
SUGGESTED CMS G CODE MODIFIER MOBILITY: CN
EATING MEALS: TOTAL

## 2019-01-01 ASSESSMENT — LIFESTYLE VARIABLES
TOTAL SCORE: 0
HOW MANY TIMES IN THE PAST YEAR HAVE YOU HAD 5 OR MORE DRINKS IN A DAY: 0
EVER FELT BAD OR GUILTY ABOUT YOUR DRINKING: NO
AVERAGE NUMBER OF DAYS PER WEEK YOU HAVE A DRINK CONTAINING ALCOHOL: 0
HAVE YOU EVER FELT YOU SHOULD CUT DOWN ON YOUR DRINKING: NO
EVER HAD A DRINK FIRST THING IN THE MORNING TO STEADY YOUR NERVES TO GET RID OF A HANGOVER: NO
HAVE PEOPLE ANNOYED YOU BY CRITICIZING YOUR DRINKING: NO
ALCOHOL_USE: NO
CONSUMPTION TOTAL: NEGATIVE
TOTAL SCORE: 0
ON A TYPICAL DAY WHEN YOU DRINK ALCOHOL HOW MANY DRINKS DO YOU HAVE: 0
TOTAL SCORE: 0
DOES PATIENT WANT TO STOP DRINKING: NO

## 2019-01-01 ASSESSMENT — PATIENT HEALTH QUESTIONNAIRE - PHQ9
SUM OF ALL RESPONSES TO PHQ9 QUESTIONS 1 AND 2: 0
1. LITTLE INTEREST OR PLEASURE IN DOING THINGS: NOT AT ALL
2. FEELING DOWN, DEPRESSED, IRRITABLE, OR HOPELESS: NOT AT ALL

## 2019-05-21 NOTE — PROGRESS NOTES
See scanned pre employment physical and health questionnaire. Unremarkable PMH/FH. No disqualifying blood pressure, medications, or conditions. Exam normal.

## 2019-12-05 PROBLEM — A41.9 SEPTIC SHOCK (HCC): Status: ACTIVE | Noted: 2019-01-01

## 2019-12-05 PROBLEM — I46.9 CARDIAC ARREST (HCC): Status: ACTIVE | Noted: 2019-01-01

## 2019-12-05 PROBLEM — J18.9 PNEUMONIA: Status: ACTIVE | Noted: 2019-01-01

## 2019-12-05 PROBLEM — E87.20 METABOLIC ACIDOSIS: Status: ACTIVE | Noted: 2019-01-01

## 2019-12-05 PROBLEM — J96.01 ACUTE RESPIRATORY FAILURE WITH HYPOXIA (HCC): Status: ACTIVE | Noted: 2019-01-01

## 2019-12-05 PROBLEM — N17.9 AKI (ACUTE KIDNEY INJURY) (HCC): Status: ACTIVE | Noted: 2019-01-01

## 2019-12-05 PROBLEM — R65.21 SEPTIC SHOCK (HCC): Status: ACTIVE | Noted: 2019-01-01

## 2019-12-05 PROBLEM — I21.4 NSTEMI (NON-ST ELEVATED MYOCARDIAL INFARCTION) (HCC): Status: ACTIVE | Noted: 2019-01-01

## 2019-12-05 NOTE — ASSESSMENT & PLAN NOTE
Viral etiology with possible bacterial superinfection  Continue antiviral and antibiotics  Follow-up on final blood and sputum cultures

## 2019-12-05 NOTE — DISCHARGE PLANNING
SW responded to Post Cardiac Arrest.  Pt is a 18 year old male coming from home.  Pt Girlfriend Trista arrived with ambulance.  SW with Trista providing support- other family has been contacted and will be arriving.     TIP Volunteer Rocky Hopsonirineo [phone] arrived to offer support to family.    ERP has updated family on Medical Condition.  Family allowed to be at bedside.    SW will remain available for support as needed.

## 2019-12-05 NOTE — ED PROVIDER NOTES
ED Provider Note    Scribed for Arsenio White M.D. by Manoj Saba. 12/5/2019  8:42 AM    Primary care provider: Pcp Pt States None  Means of arrival: EMS  History obtained from: EMS  History limited by: ALOC    CHIEF COMPLAINT  Chief Complaint   Patient presents with   • Cardiac Arrest       HPI  Jonny French is an otherwise healthy 18 y.o. male who presents to the Emergency Department via EMS post-cardiac arrest. Per EMS, patient had been feeling ill for several days and laying in bed. His girlfriend noticed this morning that he woke up sweating and not breathing very well. She took him to get a cold shower at which point he collapsed. CPR was initiated. Upon EMS arrival, patient was in asystole. CPR was done for 10 mins and patient was treated with Epi with a return of normal sinus. Pulses were lost again, CPR was resumed, Epi was administered again and pulses returned. Patient was intubated by EMS. Patient was found to have a blood sugar of 26 on scene by EMS, but this improved to 286 after D10 was administered. Patient received a 1L NS bolus en route. Patient does not have a history of drug use, alcohol use, or other known past medical history. Per family, patient was up and active yesterday. They drove to [place] about 1 week ago, but otherwise, he has not been on any long plane rides. Per patient's sister, both her and the patient are adopted, so they do not have much knowledge on her past family history.     Further history of present illness cannot be obtained due to the patient's ALOC, cardiac arrest, and intubation.     REVIEW OF SYSTEMS  Pertinent positives include ALOC.     See HPI for further details.   Further ROS cannot be obtained due to the patient's ALOC, cardiac arrest, and intubation.    PAST MEDICAL HISTORY  No known medical history.     SURGICAL HISTORY  Unable to obtain due to acuity of condition.    SOCIAL HISTORY  Social History     Tobacco Use   • Smoking status: Never Smoker  "  • Smokeless tobacco: Never Used   Substance Use Topics   • Alcohol use: No   • Drug use: No      Social History     Substance and Sexual Activity   Drug Use No       FAMILY HISTORY  Unknown family history.     CURRENT MEDICATIONS  No known medications.    ALLERGIES  No Known Allergies    PHYSICAL EXAM  VITAL SIGNS: BP (!) 50/23   Pulse 67   Temp 36.9 °C (98.5 °F) (Temporal)   Resp 24  Ht 1.778 m (5' 10\")   Wt 81.6 kg (180 lb)   SpO2 (!) 68%   BMI 25.83 kg/m²     Nursing note and vitals reviewed.  Constitutional: Intubated. GCS 3.  Critically ill-appearing male.  Cool to touch, cyanotic  HENT: Head is normocephalic and atraumatic.  Eyes: Pupils are fixed at 4 mm bilaterally.  Cardiovascular: Cool to touch, thready peripheral pulses, normal rate and regular rhythm. No murmur heard. Pulmonary/Chest: Intubated, respirations assisted with bagging breath sounds normal. No wheezes or rales.   Abdominal: Soft, not distended.    Musculoskeletal: No gross deformities.  No swelling.  Neurological: GCS of 3T.  No spontaneous movement noted.  Skin: Skin is warm and dry. No rash.   Psychiatric: Unable to assess.    DIAGNOSTIC STUDIES / PROCEDURES    EKG Interpretation  Interpreted by me as below    LABS  Results for orders placed or performed during the hospital encounter of 12/05/19   EC-ECHOCARDIOGRAM COMPLETE W/O CONT   Result Value Ref Range    Eject.Frac. MOD BP 63.52     Eject.Frac. MOD 4C 61.7     Eject.Frac. MOD 2C 63.28     Left Ventrical Ejection Fraction 60    LACTIC ACID   Result Value Ref Range    Lactic Acid 11.3 (HH) 0.5 - 2.0 mmol/L   CBC WITH DIFFERENTIAL   Result Value Ref Range    WBC 7.1 4.8 - 10.8 K/uL    RBC 4.66 (L) 4.70 - 6.10 M/uL    Hemoglobin 14.3 14.0 - 18.0 g/dL    Hematocrit 46.1 42.0 - 52.0 %    MCV 98.9 (H) 81.4 - 97.8 fL    MCH 30.7 27.0 - 33.0 pg    MCHC 31.0 (L) 33.7 - 35.3 g/dL    RDW 45.9 35.9 - 50.0 fL    Platelet Count 293 164 - 446 K/uL    MPV 11.6 9.0 - 12.9 fL    " Neutrophils-Polys 16.80 (L) 44.00 - 72.00 %    Lymphocytes 58.40 (H) 22.00 - 41.00 %    Monocytes 4.40 0.00 - 13.40 %    Eosinophils 0.00 0.00 - 6.90 %    Basophils 0.00 0.00 - 1.80 %    Nucleated RBC 1.00 /100 WBC    Neutrophils (Absolute) 1.89 1.82 - 7.42 K/uL    Lymphs (Absolute) 4.15 1.00 - 4.80 K/uL    Monos (Absolute) 0.31 0.00 - 0.85 K/uL    Eos (Absolute) 0.00 0.00 - 0.51 K/uL    Baso (Absolute) 0.00 0.00 - 0.12 K/uL    NRBC (Absolute) 0.07 K/uL    Anisocytosis 1+     Macrocytosis 1+    COMP METABOLIC PANEL   Result Value Ref Range    Sodium 145 135 - 145 mmol/L    Potassium 4.1 3.6 - 5.5 mmol/L    Chloride 105 96 - 112 mmol/L    Co2 15 (L) 20 - 33 mmol/L    Anion Gap 25.0 (H) 0.0 - 11.9    Glucose 104 (H) 65 - 99 mg/dL    Bun 28 (H) 8 - 22 mg/dL    Creatinine 2.91 (H) 0.50 - 1.40 mg/dL    Calcium 8.0 (L) 8.5 - 10.5 mg/dL    AST(SGOT) 169 (H) 12 - 45 U/L    ALT(SGPT) 187 (H) 2 - 50 U/L    Alkaline Phosphatase 100 (H) 30 - 99 U/L    Total Bilirubin 0.3 0.1 - 1.5 mg/dL    Albumin 2.9 (L) 3.2 - 4.9 g/dL    Total Protein 4.7 (L) 6.0 - 8.2 g/dL    Globulin 1.8 (L) 1.9 - 3.5 g/dL    A-G Ratio 1.6 g/dL   URINALYSIS   Result Value Ref Range    Color Yellow     Character Cloudy (A)     Specific Gravity 1.028 <1.035    Ph 5.5 5.0 - 8.0    Glucose Negative Negative mg/dL    Ketones Negative Negative mg/dL    Protein 30 (A) Negative mg/dL    Bilirubin Negative Negative    Urobilinogen, Urine 0.2 Negative    Nitrite Negative Negative    Leukocyte Esterase Negative Negative    Occult Blood Negative Negative    Micro Urine Req Microscopic    ARTERIAL BLOOD GAS w/ O2 (LAB)   Result Value Ref Range    Ph 6.74 (LL) 7.40 - 7.50    Pco2 84.8 (HH) 26.0 - 37.0 mmHg    Po2 91.6 (H) 64.0 - 87.0 mmHg    O2 Saturation 86.7 (L) 93.0 - 99.0 %    Hco3 11 (L) 17 - 25 mmol/L    Base Excess -25 (L) -4 - 3 mmol/L    Body Temp see below Centigrade   Influenza A/B By PCR (Adult - Flu Only)   Result Value Ref Range    Influenza virus A RNA  Negative Negative    Influenza virus B, PCR POSITIVE (A) Negative   TROPONIN   Result Value Ref Range    Troponin T 97 (H) 6 - 19 ng/L   CREATINE KINASE   Result Value Ref Range    CPK Total 144 0 - 154 U/L   DIAGNOSTIC ALCOHOL   Result Value Ref Range    Diagnostic Alcohol 0.00 0.00 g/dL   URINE DRUG SCREEN   Result Value Ref Range    Amphetamines Urine Negative Negative    Barbiturates Negative Negative    Benzodiazepines Negative Negative    Cocaine Metabolite Negative Negative    Methadone Negative Negative    Opiates Positive (A) Negative    Oxycodone Negative Negative    Phencyclidine -Pcp Negative Negative    Propoxyphene Negative Negative    Cannabinoid Metab Negative Negative   Magnesium   Result Value Ref Range    Magnesium 2.7 (H) 1.5 - 2.5 mg/dL   Phosphorus   Result Value Ref Range    Phosphorus 11.7 (HH) 2.5 - 4.5 mg/dL   URINE MICROSCOPIC (W/UA)   Result Value Ref Range    WBC 0-2 (A) /hpf    RBC 2-5 (A) /hpf    Bacteria Negative None /hpf    Epithelial Cells Negative /hpf    Hyaline Cast 0-2 /lpf   ESTIMATED GFR   Result Value Ref Range    GFR If  33 (A) >60 mL/min/1.73 m 2    GFR If Non  28 (A) >60 mL/min/1.73 m 2   DIFFERENTIAL MANUAL   Result Value Ref Range    Bands-Stabs 9.80 0.00 - 10.00 %    Metamyelocytes 9.70 %    Myelocytes 0.90 %    Manual Diff Status PERFORMED    PERIPHERAL SMEAR REVIEW   Result Value Ref Range    Peripheral Smear Review see below    PLATELET ESTIMATE   Result Value Ref Range    Plt Estimation Normal    MORPHOLOGY   Result Value Ref Range    RBC Morphology Present     Smudge Cells Few     Reactive Lymphocytes Few    ACCU-CHEK GLUCOSE   Result Value Ref Range    Glucose - Accu-Ck 77 65 - 99 mg/dL   EKG   Result Value Ref Range    Report       [facility] Emergency Dept.    Test Date:  2019-12-05  Pt Name:    MELODY VELARDE                 Department: ER  MRN:        [id number]                      Room:        06  Gender:      Male                         Technician: [id number]  :        2000                   Requested By:YOVANY AMANDA  Order #:    [id number]                    Reading MD: YOVANY AMANDA MD    Measurements  Intervals                                Axis  Rate:       66                           P:          77  SC:         132                          QRS:        62  QRSD:       102                          T:  QT:         452  QTc:        474    Interpretive Statements  SINUS RHYTHM  REPOLARIZATION ABNORMALITY  BORDERLINE PROLONGED QT INTERVAL  Compared to ECG 2012 04:04:29  New ST segment changes  Electronically Signed On 2019 9:44:58 PST by YOVANY AMANDA MD       All labs reviewed by me.    RADIOLOGY  DX-CHEST-PORTABLE (1 VIEW)   Final Result         1. Left IJ central catheter is well-positioned. No pneumothorax.   2. Well-positioned ETT and NGT.   3. Pulmonary edema versus multifocal pneumonia. No pleural effusions.      EC-ECHOCARDIOGRAM COMPLETE W/O CONT   Final Result      CT-CTA CHEST PULMONARY ARTERY W/ RECONS   Final Result      No evidence pulmonary emboli.   Diffuse patchy nodular opacities throughout both lungs predominantly within the posterior lungs. No associated pleural effusion. Differential considerations include atypical multifocal pneumonia. Edema and ARDS are also considerations. Pulmonary    hemorrhage is consideration.   Diffuse soft tissue thickening within both jessica. Adenopathy not excluded.            CT-HEAD W/O   Final Result      1.  No CT evidence of acute infarct, hemorrhage or mass.   2.  Mild to moderate sinus disease.        The radiologist's interpretation of all radiological studies have been reviewed by me.        COURSE & MEDICAL DECISION MAKING  Nursing notes, VS, PMSFHx reviewed in chart.     8:42 AM - Patient seen and examined at bedside. Patient started on LR bolus on pressure bag and Levophed drip. Ordered CTA chest, CT head, lactic, to evaluate his  symptoms. The differential diagnoses include but are not limited to: Pneumonia, sepsis, overdose, intercranial hemorrhage, pneumothorax    8:49 AM - I was recalled to the patient's bedside again due to low O2 Sats of 28%, BP, and bradycardia. Patient treated with a round of Epi which improved his heart rate. BP improved to 92/48. Considered alteplase treatment due to concerns for a possible PE.    8:53 AM - Due to patient's poor O2 sats, I visually confirmed the patient's tube placement.     8:56 AM - Vitals improved at 107/37 with a 90% O2 sat.     9:00 AM - ABG returned with a pH of 6.7.     9:05 AM - Patient wheeled to CT. I spoke to Dr. Wilson (Cardiology) who recommended that the patient go to the cath lab after CT due to the patient's abnormal EKG findings.     9:10 AM - I spoke to Dr. Kyle (Intensivist) who will come to the ED to consult on the patient.     9:20 AM - Dr. Alvarez (Cardiology) is now at bedside. Discussed the patient's case and the above findings. She will plan to order an Echo.    9:30 AM - I discussed the patient's case and the above findings with Dr. Serrano (Hospitalist) who accepts the patient for hospitalization.     10:00 AM -  I counseled the patient's family on his condition and the labs and radiology available at this time. Upon further discussion with them about his medical history, they do note that the patient regularly vapes. He has also had episodes in the past of opioid overdose, the last being a few weeks ago.    CRITICAL CARE  I provided critical care services, which included medication orders, frequent reevaluations of the patient's condition and response to treatment, ordering and reviewing test results, and discussing the case with various consultants.  The critical care time associated with the care of the patient was 75 minutes. Review chart for interventions. This time is exclusive of any other billable procedures.      DISPOSITION:  Patient will be hospitalized by  Dr. Serrano in critical condition.       FINAL IMPRESSION  1. Acute respiratory failure with hypoxia (HCC)    2. Cardiopulmonary arrest (HCC)    3. Cardiac arrest (HCC)    4. Lactic acidosis    5. Elevated troponin    6. Abnormal EKG          Manoj BANERJEE (Scribe), am scribing for, and in the presence of, Arsenio White M.D..    Electronically signed by: Manoj Saba (Scribe), 12/5/2019    IArsenio M.D. personally performed the services described in this documentation, as scribed by Manoj Saba in my presence, and it is both accurate and complete. C    The note accurately reflects work and decisions made by me.  Arsenio White  12/5/2019  11:32 AM

## 2019-12-05 NOTE — CARE PLAN
Problem: Ventilation Defect:  Goal: Ability to achieve and maintain unassisted ventilation or tolerate decreased levels of ventilator support  Outcome: PROGRESSING AS EXPECTED        Ventilator Daily Summary    Vent Day # 1     Ventilator settings changed this shift: APRV: PH/PL 30/0 TH/TL 4.5/.65    Plan: Continue current ventilator settings and wean mechanical ventilation as tolerated per physician orders.

## 2019-12-05 NOTE — ASSESSMENT & PLAN NOTE
Intubated 12/5  Continue full mechanical ventilatory support, decrease PEEP to 12, respiratory to 18  Advance endotracheal tube by 2 cm  Limit all sedatives/analgesics for close neurologic monitoring  RT/O2 protocol  Lung protective ventilation strategy in the setting of severe ARDS

## 2019-12-05 NOTE — PROGRESS NOTES
Pulmonary/Critical Care Medicine   Progress Note    Date of service: 12/5/2019  Time: 1400    Called to bedside to assume critical care management of this patient from my partner Dr. Kyle.  In short patient is a very critically ill young male with influenza B resulting in multiorgan failure, septic shock, ARDS and cardiac arrest.  He is not a temperature targeted management candidate given difficulty maintaining adequate blood pressure and oxygenation.  He is extremely acidotic and has been started on a sodium bicarbonate drip with ongoing aggressive fluid resuscitation.  He is receiving broad-spectrum antibiotics in addition to antiviral.  At the time of my evaluation patient is on full mechanical ventilatory support with a mild improvement in his acidosis.  He remains hypoxic with an SaO2 of 87% on 100% FiO2 and a mode of a PRV.  I ordered a dose of 10 mg of IV vecuronium given his dyssynchrony with the ventilator and persistent hypoxia.  We are also setting up to place the patient in prone positioning for his ARDS.  He will be started on a fentanyl drip in addition to the propofol in the setting of being paralyzed.  Efforts are being made to seek a quaternary facility that can take the patient for possible ARDS should he continue to decline.  He is making urine at this point and neurologically has not shown signs of awakening post cardiac arrest.  His glucose initially was extremely low and he was given D10 in the emergency department and it is now up to 420 and the additional dextrose drip that was ordered I will discontinue.  He remains on norepinephrine infusion at a rate of 28 mcg and I will start vasopressin as well.    I spent 50 min in reviewing the patient's condition, physical examination, laboratory and imaging data, prior documentation, in discussion with RN, RT, pharmacy, Dr. Kyle, and in formulating an assessment/plan.    Critical Care time: 50 min. No time overlap, procedures not included in  time.  [id number]

## 2019-12-05 NOTE — ASSESSMENT & PLAN NOTE
- continue ventilator and respiratory support per pulmonary/critical care.   -continue antibiotics for pneumonia.

## 2019-12-05 NOTE — ASSESSMENT & PLAN NOTE
This is Septic shock Present on admission -uncontrolled  SIRS criteria identified on my evaluation include: Hypothermia, with temperature less than 96.8 deg F and Tachycardia, with heart rate greater than 90 BPM  Source is pulmonary  Presentation includes: Severe sepsis present, persistent hypotension after 30 ml/kg completed, and initial lactate level result is >= 4 mmol/L.   Despite appropriate fluid resuscitation with crystalloid given per sepsis guidelines, the patient remains hypotensive with systolic blood pressure less than 90 or MAP less than 65  Hemodynamic support with additional fluids and IV vasopressors as needed to maintain a SBP of 90 or MAP of 65  IV antibiotics as appropriate for source of sepsis  Reassessment: I have reassessed the patient's hemodynamic status    Status post sepsis protocol/resuscitation  Continue broad-spectrum antibiotics/antiviral  Follow-up on final cultures  Continue to titrate norepinephrine to maintain mean arterial pressure greater than 65, trend CVP, lactic acid level, urine output

## 2019-12-05 NOTE — CONSULTS
Critical Care Consultation    Date of consult: 12/5/2019    Referring Physician  Arsenio White M.D.    Reason for Consultation  Cardiac arrest    History of Presenting Illness  18 y.o. male who presented 12/5/2019 postcardiac arrest.  Patient currently on full mechanical ventilatory support on probation taken from chart review and sign out.  It appears that patient had not been feeling well for several days.  URI type symptoms, general unwellness, and flulike symptoms.  His girlfriend noted him this morning to be diaphoretic and less responsive, she attempted to [place] with cold water however he subsequently became less responsive and pulseless immediate bystander CPR was initiated, reportedly patient was asystolic on EMS arrival and had a total of approximately 10 minutes of ACLS prior to ROSC.  Further he was noted to be hypoxic and hypoglycemic.  On arrival patient with labile pressure, currently requiring 2 pressors, was intubated in the field, stat CT head was negative for acute findings, CT chest with diffuse bilateral reticulonodular opacities.  Clinically patient ARDS, shock, multiorgan failure, with uncertainty to any underlying neurologic injury from cardiac arrest.  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    Addendum  Flu B returned positive.  Tamiflu initiated.  Worsening oxygenation.  Given patient's hemodynamic instability may be difficult for pronating, will initiate Flolan, adjusting ventilator to improve oxygenation.  will move towards APRV if not responsive.  Further considerations would be transferred to ECMO center if no significant improvement.    Code Status  Prior    Review of Systems  Review of Systems   Unable to perform ROS: Acuity of condition       Past Medical History   has a past medical history of Meningitis and Seizure disorder (HCC).    Surgical History   has no  past surgical history on file.    Family History  Family history is unknown by patient.  Unable to obtain, patient adopted    Social History   reports that he has never smoked. He has never used smokeless tobacco. He reports that he does not drink alcohol or use drugs.  Vapes heavily    Medications  Home Medications     Reviewed by Juan Carlos La (Pharmacy Tech) on 12/05/19 at 1021  Med List Status: Complete   Medication Last Dose Status        Patient Lan Taking any Medications                     Current Facility-Administered Medications   Medication Dose Route Frequency Provider Last Rate Last Dose   • EPINEPHrine 30 mg/30mL (ADRENALIN) 4 mg in  mL Infusion  0-10 mcg/min Intravenous Continuous Arsenio White M.D. 7.5 mL/hr at 12/05/19 0922 2 mcg/min at 12/05/19 0922   • Respiratory Care per Protocol   Nebulization Continuous RT Abhishek Kyle M.D.       • ipratropium-albuterol (DUONEB) nebulizer solution  3 mL Nebulization Q2HRS PRN (RT) Abhishek Kyle M.D.       • famotidine (PEPCID) tablet 20 mg  20 mg Enteral Tube Q12HRS Abhishek Kyle M.D.        Or   • famotidine (PEPCID) injection 20 mg  20 mg Intravenous Q12HRS Abhishek Kyle M.D.       • senna-docusate (PERICOLACE or SENOKOT S) 8.6-50 MG per tablet 2 Tab  2 Tab Enteral Tube BID Abhishek Kyle M.D.        And   • polyethylene glycol/lytes (MIRALAX) PACKET 1 Packet  1 Packet Enteral Tube QDAY PRN Abhishek Kyle M.D.        And   • magnesium hydroxide (MILK OF MAGNESIA) suspension 30 mL  30 mL Enteral Tube QDAY PRN Abhishek Kyle M.D.        And   • bisacodyl (DULCOLAX) suppository 10 mg  10 mg Rectal QDAY PRN Abhishek Kyle M.D.       • MD Alert...ICU Electrolyte Replacement per Pharmacy   Other PHARMACY TO DOSE Abhishek Kyle M.D.       • lidocaine (XYLOCAINE) 1 % injection 1-2 mL  1-2 mL Tracheal Tube Q30 MIN PRN Abhishek Kyle M.D.       • dexmedetomidine (PRECEDEX) 400 mcg/100mL NS premix infusion  0-1.5 mcg/kg/hr  Intravenous Continuous Abhishek Kyle M.D.       • fentaNYL (SUBLIMAZE) injection 25 mcg  25 mcg Intravenous Q HOUR PRN Abhishek Kyle M.D.        Or   • fentaNYL (SUBLIMAZE) injection 50 mcg  50 mcg Intravenous Q HOUR PRN Abhishek Kyle M.D.        Or   • fentaNYL (SUBLIMAZE) injection 100 mcg  100 mcg Intravenous Q HOUR PRN Abhishek Kyle M.D.       • insulin regular (HUMULIN R) injection 1-6 Units  1-6 Units Subcutaneous Q6HRS Abhishek Kyle M.D.        And   • glucose 4 g chewable tablet 16 g  16 g Oral Q15 MIN PRN Abhishek Kyle M.D.        And   • DEXTROSE 10% BOLUS 250 mL  250 mL Intravenous Q15 MIN PRN Abhishek Kyle M.D.       • MD Alert...Vancomycin per Pharmacy   Other PHARMACY TO DOSE Abhishek Kyle M.D.       • piperacillin-tazobactam (ZOSYN) 4.5 g in  mL IVPB  4.5 g Intravenous Once Abhishek Kyle M.D.        And   • piperacillin-tazobactam (ZOSYN) 4.5 g in  mL IVPB  4.5 g Intravenous Q8HRS Abhishek Kyle M.D.       • vancomycin (VANCOCIN) 2,000 mg in  mL IVPB  25 mg/kg Intravenous Once Abhishek Kyle M.D.       • doxycycline (VIBRAMYCIN) 100 mg in  mL IVPB  100 mg Intravenous Q12HRS Abhishek Kyle M.D.       • lactated ringers infusion (BOLUS): BMI less than or equal to 30  30 mL/kg Intravenous Once PRN Primo Serrano M.D.       • lactated ringers infusion   Intravenous Continuous Primo Serrano M.D.       • NS infusion 1,000 mL  1,000 mL Intravenous Once Abhishek Kyle M.D.       • epoprostenol (FLOLAN) 1.5 mg in glycine diluent for flolan 50 mL for Inhalation  0.01-0.05 mcg/kg/min Inhalation Continuous Abhishek Kyle M.D.       • sodium bicarbonate 8.4 % 150 mEq in D5W 1,000 mL infusion  150 mEq Intravenous Continuous Abhishek Kyle M.D.         No current outpatient medications on file.       Allergies  No Known Allergies    Vital Signs last 24 hours  Temp:  [36.9 °C (98.5 °F)] 36.9 °C (98.5 °F)  Pulse:  [] 117  Resp:  [15-33] 31  BP:  ()/(23-70) 125/54  SpO2:  [68 %-96 %] 90 %    Physical Exam  Physical Exam  Vitals signs and nursing note reviewed.   Constitutional:       Appearance: He is ill-appearing and toxic-appearing.      Comments: Unresponsive on vent   HENT:      Head: Normocephalic and atraumatic.      Nose: Nose normal.   Eyes:      Conjunctiva/sclera: Conjunctivae normal.      Pupils: Pupils are equal, round, and reactive to light.   Cardiovascular:      Rate and Rhythm: Tachycardia present.      Pulses: Normal pulses.   Pulmonary:      Effort: Respiratory distress present.      Breath sounds: Rhonchi present. No wheezing or rales.   Abdominal:      General: There is no distension.      Palpations: Abdomen is soft.      Tenderness: There is no tenderness.   Musculoskeletal:         General: No swelling.   Skin:     General: Skin is warm and dry.   Neurological:      Cranial Nerves: No cranial nerve deficit.   Psychiatric:      Comments: Unresponsive         Fluids  No intake or output data in the 24 hours ending 19 1024    Laboratory  Recent Results (from the past 48 hour(s))   EKG    Collection Time: 19  8:39 AM   Result Value Ref Range    Report       [facility] Emergency Dept.    Test Date:  2019  Pt Name:    MELODY VELARDE                 Department: ER  MRN:        [id number]                      Room:       [facility]  Gender:     Male                         Technician: [id number]  :        2000                   Requested By:YOVANY AMANDA  Order #:    [id number]                    Reading MD: YOVANY AMANDA MD    Measurements  Intervals                                Axis  Rate:       66                           P:          77  AK:         132                          QRS:        62  QRSD:       102                          T:  QT:         452  QTc:        474    Interpretive Statements  SINUS RHYTHM  REPOLARIZATION ABNORMALITY  BORDERLINE PROLONGED QT INTERVAL  Compared to ECG  08/01/2012 04:04:29  New ST segment changes  Electronically Signed On 12-5-2019 9:44:58 PST by YOVANY AMANDA MD     LACTIC ACID    Collection Time: 12/05/19  8:45 AM   Result Value Ref Range    Lactic Acid 11.3 (HH) 0.5 - 2.0 mmol/L   COMP METABOLIC PANEL    Collection Time: 12/05/19  8:45 AM   Result Value Ref Range    Sodium 145 135 - 145 mmol/L    Potassium 4.1 3.6 - 5.5 mmol/L    Chloride 105 96 - 112 mmol/L    Co2 15 (L) 20 - 33 mmol/L    Anion Gap 25.0 (H) 0.0 - 11.9    Glucose 104 (H) 65 - 99 mg/dL    Bun 28 (H) 8 - 22 mg/dL    Creatinine 2.91 (H) 0.50 - 1.40 mg/dL    Calcium 8.0 (L) 8.5 - 10.5 mg/dL    AST(SGOT) 169 (H) 12 - 45 U/L    ALT(SGPT) 187 (H) 2 - 50 U/L    Alkaline Phosphatase 100 (H) 30 - 99 U/L    Total Bilirubin 0.3 0.1 - 1.5 mg/dL    Albumin 2.9 (L) 3.2 - 4.9 g/dL    Total Protein 4.7 (L) 6.0 - 8.2 g/dL    Globulin 1.8 (L) 1.9 - 3.5 g/dL    A-G Ratio 1.6 g/dL   TROPONIN    Collection Time: 12/05/19  8:45 AM   Result Value Ref Range    Troponin T 97 (H) 6 - 19 ng/L   CREATINE KINASE    Collection Time: 12/05/19  8:45 AM   Result Value Ref Range    CPK Total 144 0 - 154 U/L   DIAGNOSTIC ALCOHOL    Collection Time: 12/05/19  8:45 AM   Result Value Ref Range    Diagnostic Alcohol 0.00 0.00 g/dL   Magnesium    Collection Time: 12/05/19  8:45 AM   Result Value Ref Range    Magnesium 2.7 (H) 1.5 - 2.5 mg/dL   Phosphorus    Collection Time: 12/05/19  8:45 AM   Result Value Ref Range    Phosphorus 11.7 (HH) 2.5 - 4.5 mg/dL   ESTIMATED GFR    Collection Time: 12/05/19  8:45 AM   Result Value Ref Range    GFR If  33 (A) >60 mL/min/1.73 m 2    GFR If Non  28 (A) >60 mL/min/1.73 m 2   ACCU-CHEK GLUCOSE    Collection Time: 12/05/19  8:52 AM   Result Value Ref Range    Glucose - Accu-Ck 77 65 - 99 mg/dL   URINALYSIS    Collection Time: 12/05/19  9:00 AM   Result Value Ref Range    Color Yellow     Character Cloudy (A)     Specific Gravity 1.028 <1.035    Ph 5.5 5.0 - 8.0     Glucose Negative Negative mg/dL    Ketones Negative Negative mg/dL    Protein 30 (A) Negative mg/dL    Bilirubin Negative Negative    Urobilinogen, Urine 0.2 Negative    Nitrite Negative Negative    Leukocyte Esterase Negative Negative    Occult Blood Negative Negative    Micro Urine Req Microscopic    URINE DRUG SCREEN    Collection Time: 12/05/19  9:00 AM   Result Value Ref Range    Amphetamines Urine Negative Negative    Barbiturates Negative Negative    Benzodiazepines Negative Negative    Cocaine Metabolite Negative Negative    Methadone Negative Negative    Opiates Positive (A) Negative    Oxycodone Negative Negative    Phencyclidine -Pcp Negative Negative    Propoxyphene Negative Negative    Cannabinoid Metab Negative Negative   URINE MICROSCOPIC (W/UA)    Collection Time: 12/05/19  9:00 AM   Result Value Ref Range    WBC 0-2 (A) /hpf    RBC 2-5 (A) /hpf    Bacteria Negative None /hpf    Epithelial Cells Negative /hpf    Hyaline Cast 0-2 /lpf   ARTERIAL BLOOD GAS w/ O2 (LAB)    Collection Time: 12/05/19  9:01 AM   Result Value Ref Range    Ph 6.74 (LL) 7.40 - 7.50    Pco2 84.8 (HH) 26.0 - 37.0 mmHg    Po2 91.6 (H) 64.0 - 87.0 mmHg    O2 Saturation 86.7 (L) 93.0 - 99.0 %    Hco3 11 (L) 17 - 25 mmol/L    Base Excess -25 (L) -4 - 3 mmol/L    Body Temp see below Centigrade       Imaging  EC-ECHOCARDIOGRAM COMPLETE W/O CONT         CT-CTA CHEST PULMONARY ARTERY W/ RECONS   Final Result      No evidence pulmonary emboli.   Diffuse patchy nodular opacities throughout both lungs predominantly within the posterior lungs. No associated pleural effusion. Differential considerations include atypical multifocal pneumonia. Edema and ARDS are also considerations. Pulmonary    hemorrhage is consideration.   Diffuse soft tissue thickening within both jessica. Adenopathy not excluded.            CT-HEAD W/O   Final Result      1.  No CT evidence of acute infarct, hemorrhage or mass.   2.  Mild to moderate sinus disease.       DX-CHEST-PORTABLE (1 VIEW)    (Results Pending)       Assessment/Plan  Septic shock (HCC)- (present on admission)  Assessment & Plan  This is Septic shock Present on admission  SIRS criteria identified on my evaluation include: Hypothermia, with temperature less than 96.8 deg F and Tachycardia, with heart rate greater than 90 BPM  Source is pulmonary  Presentation includes: Severe sepsis present, persistent hypotension after 30 ml/kg completed, and initial lactate level result is >= 4 mmol/L.   Despite appropriate fluid resuscitation with crystalloid given per sepsis guidelines, the patient remains hypotensive with systolic blood pressure less than 90 or MAP less than 65  Hemodynamic support with additional fluids and IV vasopressors as needed to maintain a SBP of 90 or MAP of 65  IV antibiotics as appropriate for source of sepsis  Reassessment: I have reassessed the patient's hemodynamic status    Suspect pulmonary source  Panculture  Sepsis protocol  Empiric antibiotics  Monitor for need of antiviral if flu positive  Continue vasopressor support titrate to maintain map greater than 65  Central line placement  Trend lactate    Acute respiratory failure with hypoxia (HCC)- (present on admission)  Assessment & Plan  Intubated 12/5  Continue full mechanical dilatory support  I am adjusting ventilator based on ABG and pulmonary mechanics  Bronchoscopy with BAL and therapeutic aspiration  Check flu, atypical pneumonia panel, bacterial pneumonia  Empiric antibiotic coverage with vancomycin and Zosyn  Add Tamiflu if flu positive  Can have vaping injury but other causes much to be ruled out first  Plan for 6 cc/kg tidal volume per ARDS NET  Monitor volume status  Follow-up echocardiogram  Evaluate need of steroids    Cardiac arrest (HCC)- (present on admission)  Assessment & Plan  Suspect hypoxia induced  Primary cardiac work-up in process, UDS, evaluate for electrolyte abnormality  Stat bedside echo did not show  significant WMA  Cardiology consulted  Continue supportive care  Follow-up pending labs for electrolyte disturbances  EKG with depressions and repolarization findings-cardiology is seen no indication for emergent cath at this time    Metabolic acidosis- (present on admission)  Assessment & Plan  Secondary to systemic hypoperfusion fusion and cardiac arrest  Lactate 11  Start bicarb infusion  Serial BMP  Check ASA and salicylate level  No reported history of any ingestions  Trend    Pneumonia- (present on admission)  Assessment & Plan  Suspicious for viral etiology  Evaluate for bacterial infection  Patient heavily vapes and this could be acute lung injury secondary to  Cultures  Empiric antibiotics  Continue full mechanical dilatory support    PHUC (acute kidney injury) (HCC)  Assessment & Plan  Suspect ATN/prerenal  Renally dose medication minimize nephrotoxic substances  Monitor urine output  Renal ultrasound  Check CPK  UA  Nephrology consultation for evaluation of HD if has refractory acidosis or severe electrolyte abnormality      Discussed patient condition and risk of morbidity and/or mortality with Hospitalist, RN, RT, Pharmacy and cardiology.  ERP  The patient remains critically ill.  Critical care time = 95 minutes in directly providing and coordinating critical care and extensive data review.  No time overlap and excludes procedures.

## 2019-12-05 NOTE — PROCEDURES
Date: 12/5/2019    Procedure:  Arterial Catheter Insertion    Indication: septic shock    Physician:  Abhishek Kyle M.D.    Consent:  Emergent    Procedure:  The patient is intubated and on full mechanical vent support.  Arterial catheter is indicated for continuous invasive BP monitoring to titrate vasoactive agents.  The right wrist was prepped and draped using sterile barrier precautions.  A small bore radial artery catheter was placed in the right radial artery on the first attempt without difficulty.  A good quality arterial waveform was noted on monitor.  The catheter ws sutured in place and a sterile dressing was applied.  No immediate complications.  EBL < 5 cc.

## 2019-12-05 NOTE — ASSESSMENT & PLAN NOTE
Suspect hypoxia/respiratory failure induced  Continue supportive care  Continuous telemetry monitoring

## 2019-12-05 NOTE — ED TRIAGE NOTES
Ems called for a cpr in progress.  18 year old male with flu like symptoms for past day.  cpr started by girlfriend.  Blood sugar of 26mgdl on scene.  d10 given with cpr and 2 rounds of epi.  rosc obtained and transported here.

## 2019-12-05 NOTE — DISCHARGE PLANNING
Awaiting final acceptance from [facility]. Facesheet faxed to [facility]. Transporation request on will call.

## 2019-12-05 NOTE — ED NOTES
Med Rec completed per patient's family   Allergies reviewed  No ORAL antibiotics in last 14 days    Patient does not take any daily medications

## 2019-12-05 NOTE — ASSESSMENT & PLAN NOTE
Suspect ATN/prerenal -unchanged today, nonoliguric  Avoid nephrotoxins  Monitor creatinine, urine output, electrolytes closely

## 2019-12-05 NOTE — PROGRESS NOTES
MD order and indication verified. Rectal tone assessed.  Balloon inflated with 45mL of water and patency assessed. BMS then flushed with 45 mL of water. Stool return present/not present. Bedside RN educated regarding flushing BMS Qshift and need for BMS supersuser or wound care RN for balloon adjustments. Rectal tube order set in place.

## 2019-12-05 NOTE — PROCEDURES
"Date: 12/5/2019    Procedure:  Central Venous Catheter Insertion    Indication: Septic shock    Physician:  Abhishek Kyle M.D.    Consent: Emergent; time out performed    Procedure:  The patient was placed in the [place] position.  Appropriate \"time out\" was performed.  The left neck was prepped and draped in the usual sterile fashion.  Under full body sterile barrier precautions, a triple lumen central venous catheter was placed without difficulty in the left ij position.  US was used for localization and placement.  All lumens were flushed with sterile saline and sterile caps were applied.  The line was sutured in place and a sterile dressing was applied.  There were no immediate complications.  A post-procedure CXR will be reviewed.  Estimated blood loss < 5cc.      "

## 2019-12-05 NOTE — ED NOTES
Assist RN- Lab called with a critical ph 6.74 and pCO2 of 84.8. lab results read back to lab. erp notified and lab results read back by erp

## 2019-12-05 NOTE — ASSESSMENT & PLAN NOTE
-This is Septic shock Present on admission  SIRS criteria identified on my evaluation include: Tachycardia, with heart rate greater than 90 BPM and Tachypnea, with respirations greater than 20 per minute  Source is respiratory/pneumonia  Presentation includes: Severe sepsis present, persistent hypotension after 30 ml/kg completed, and initial lactate level result is >= 4 mmol/L.   Despite appropriate fluid resuscitation with crystalloid given per sepsis guidelines, the patient remains hypotensive with systolic blood pressure less than 90 or MAP less than 65  Hemodynamic support with additional fluids and IV vasopressors as needed to maintain a SBP of 90 or MAP of 65  IV antibiotics as appropriate for source of sepsis  Reassessment: I have reassessed the patient's hemodynamic status

## 2019-12-05 NOTE — DISCHARGE PLANNING
1326: called [facility] re emergent transfer for ECMO. They have declined as they do not have mobile ECMO capabilities  1331: called [facility] re emergent transfer for ECMO. They took all pertinent patient info, facesheet faxed. They have Epic remote access for record review. Awaiting response.

## 2019-12-05 NOTE — DISCHARGE PLANNING
· This RNCM spoke to Armin in the transfer center to initiate emergent acute to acute transfer to [facility] facility  · MD: Dr. Kyle  · Facility: [facility]

## 2019-12-05 NOTE — CONSULTS
Cardiology Initial Consultation    Date of Service  12/5/2019    Referring Physician  Arsenio White M.D.    Reason for Consultation  Cardiac arrest, abnormal ecg    History of Presenting Illness  Jonny French is a 18 y.o. male brought in 12/5/2019 by EMS with asystolic cardiac arrest. History obtained from ED provider. Pt ill at home for 2 days, woke up short of breath, setting, fever, attempted to get in the shower, collapsed, roommate did CPR, EMS arrived, pt in asytole, given meds, ROSC achieved, griselda cardia again, asystole one more time, ROSC, total CPR time not clear, estimated to be under 20 min.     Profound lactic acidosis of 11.3, pH 6.74  Trop T 97 after CPR  On Vent now.  Adequate BP with Levo and epi.  Bedside echo done.  ECG with ST depression.  CT with profound diffuse lung infection, reviewed by me, normal LM, adequate LAD, Circ, ostial RCA not well seen.    Family reports pt heavy Vapor.    Saw Peds Cardiologist Dr. Shields in 2012 for syncope, at age 13.5. Had tilt and holter done.  Mention of prolonged QT, QT noted to be 450 by cardiologist, QTc not mentioned.  Pt hospitalized a few times for syncope around that age. Had an echo with structurally normal heart.    Pt adopted, FH not known.    Review of Systems  Review of Systems    Past Medical History   has a past medical history of Meningitis and Seizure disorder (HCC).    Surgical History   has no past surgical history on file.    Family History  Family history is unknown by patient.    Social History   reports that he has never smoked. He has never used smokeless tobacco. He reports that he does not drink alcohol or use drugs.    Medications  None       Allergies  No Known Allergies    Vital signs in last 24 hours  Temp:  [36.9 °C (98.5 °F)] 36.9 °C (98.5 °F)  Pulse:  [] 110  Resp:  [15-33] 33  BP: ()/(23-62) 127/62  SpO2:  [68 %-96 %] 96 %    Physical Exam  Physical Exam     General: No acute distress. Well nourished.   Intubated.  HEENT:  No scleral icterus, no pharyngeal erythema, ET tube in place  Neck:  No JVD at 0, no bruits, trachea midline, CL in place  CVS: RRR. Normal S1, S2. No M/R/G. No LE edema.  2+ radial pulses, 2+ PT pulses, on levo/epi, pads on chest  Resp: Coarse breath sounds throughout, exp rhonchi.  Respiratory effort per the ventilator.  Abdomen: Soft, NT, no kwasi hepatomegaly.  MSK/Ext: No clubbing or cyanosis.  Skin: Warm and dry, no rashes. Pale  Neurological: Deferred, intubated and sedated  Psych: Deferred, intubated and sedated      Lab Review  Lab Results   Component Value Date/Time    WBC 5.6 09/23/2015 12:41 PM    RBC 4.81 09/23/2015 12:41 PM    HEMOGLOBIN 14.9 09/23/2015 12:41 PM    HEMATOCRIT 42.4 09/23/2015 12:41 PM    MCV 88.1 09/23/2015 12:41 PM    MCH 31.0 09/23/2015 12:41 PM    MCHC 35.1 09/23/2015 12:41 PM    MPV 12.1 09/23/2015 12:41 PM      Lab Results   Component Value Date/Time    SODIUM 138 09/23/2015 12:41 PM    POTASSIUM 4.2 09/23/2015 12:41 PM    CHLORIDE 106 09/23/2015 12:41 PM    CO2 25 09/23/2015 12:41 PM    GLUCOSE 113 (H) 09/23/2015 12:41 PM    BUN 11 09/23/2015 12:41 PM    CREATININE 1.02 09/23/2015 12:41 PM      Lab Results   Component Value Date/Time    ASTSGOT 41 09/23/2015 12:41 PM    ALTSGPT 25 09/23/2015 12:41 PM     Lab Results   Component Value Date/Time    TROPONINT 97 (H) 12/05/2019 08:45 AM       No results for input(s): NTPROBNP in the last 72 hours.    Cardiac Imaging and Procedures Review  EKG:  My personal interpretation of the EKG dated 12-5-19 is sinus, inferior and lateral ST depression, QTc 474    Echo bedside 12-5-19  Hyperdynamic LV function, EF 75% on epi, levophed, no valve disease, RV mildly dilated with normal function, no RVSP, on vent    Echocardiogram:  8-3-2012  Normal echocardiogram.    Cardiac Catheterization:  None    Imaging  CT chest 12-5-19  Reviewed by me, normal LM, adequate LAD, Circ, ostial RCA not well seen.  No evidence pulmonary  emboli.  Diffuse patchy nodular opacities throughout both lungs predominantly within the posterior lungs. No associated pleural effusion. Differential considerations include atypical multifocal pneumonia. Edema and ARDS are also considerations. Pulmonary   hemorrhage is consideration.  Diffuse soft tissue thickening within both jessica. Adenopathy not excluded.    Assessment/Plan  -Cardiac arrest, asystole, no VT/VF, most like from profound hypoxemia in setting of PNA/Flu.  -ST depression in the setting of profound metabolic abnormalities after prolonged CPR, not suspicious for ACS, normal wall motion on echo  -Mildly abnormal Trop T after CPR, not ACS  -Prior mention of borderline prolonged QT, current QTc is 474 in setting of metabolic abnormality, not concerning.  -Heavy Vaping      PLAN:  -We have established his cardiac arrest is not due to heart disease. Primary team will take over care.  Cardiology available as needed.    Cardiology Critical Care Documentation    This patient is critically ill.  I have spent a total of 40 minutes examining this patient, all lab data, imaging including ecg and echo and chest CT, and discussion with other providers including MD, RN. JUDY White, Dr. Kyle.      Thank you for allowing me to participate in the care of this patient.    Cardiology will sign off on this patient    Please contact me with any questions.    Romi Alvarez M.D.   Cardiologist, [facility]  [phone]

## 2019-12-05 NOTE — ASSESSMENT & PLAN NOTE
-Start antibiotics with IV Zosyn, and vancomycin.  He will also be on atypical coverage with doxycycline.  He is s/p FOB for BAL for cultures, will follow.  Check procalcitonin and trend.  -Continue respiratory and ventilatory support.

## 2019-12-05 NOTE — ED NOTES
Pt beginning to griselda down, hr 39bpm.  md notified.  1mg of epi given iv at this time per md.

## 2019-12-05 NOTE — H&P
Hospital Medicine History & Physical Note    Date of Service  12/5/2019    Primary Care Physician  Pcp Pt States None    Consultants  Cardiology  Pulmonary/Critical Care    Code Status  Full    Chief Complaint  Cardiac Arrest    History of Presenting Illness  18 y.o. male without much past medical history, who presented 12/5/2019 with cardiac arrest.  The patient was unable to provide any history due to intubation and sedation.  History was obtained from my conversation with the ED physician.  Apparently, the patient has been ill for several days, and this morning his girlfriend has noted him to be sweating in bed and not breathing well.  He was taken to the shower, where he collapsed and was noted to be in asystole.  ACLS was initiated, with ROSC after 10 minutes and 2 rounds of epinephrine.  He was then taken to the ED.     ED course:  The patient was initially evaluated.  He was tachycardic.  He was intubated.  Initial labs showed lactate of 11.3.  Troponin was 97.  CT angiogram of the chest was obtained which showed no PE, with note of diffuse patchy nodular opacities throughout both lungs predominantly within the posterior lungs.  CT of the head did not show any acute intracranial pathology, with mild to moderate sinus disease.  EKG (personally reviewed) showed ST depression in the inferior lateral leads.  Cardiology was consulted, who gave the opinion that the cardiac arrest was likely noncardiac in origin, and likely related to severe hypoxemia and sepsis.  Critical care was consulted who performed a fiberoptic bronchoscopy.  He was started on antibiotics, pressors, and Precedex. He was subsequently admitted to the hospital service for further management.    Review of Systems  ROS     Unable to obtain due to intubation, sedation      Past Medical History   has a past medical history of Meningitis and Seizure disorder (HCC).    Surgical History  Unable to obtain due to sedation    Family History  Unable to  obtain due to sedation/intubation    Social History   reports that he has never smoked. He has never used smokeless tobacco. He reports that he does not drink alcohol or use drugs.    Allergies  No Known Allergies    Medications  None       Physical Exam  Temp:  [36.9 °C (98.5 °F)] 36.9 °C (98.5 °F)  Pulse:  [] 117  Resp:  [15-33] 31  BP: ()/(23-70) 125/54  SpO2:  [68 %-96 %] 90 %    Physical Exam  Vitals signs reviewed.   Constitutional:       General: He is not in acute distress.     Appearance: Normal appearance. He is not toxic-appearing or diaphoretic.      Comments: Intubated, sedated   HENT:      Head: Normocephalic and atraumatic.      Mouth/Throat:      Mouth: Mucous membranes are moist.      Pharynx: No oropharyngeal exudate.   Eyes:      General: No scleral icterus.     Extraocular Movements: Extraocular movements intact.      Conjunctiva/sclera: Conjunctivae normal.      Pupils: Pupils are equal, round, and reactive to light.   Neck:      Musculoskeletal: Normal range of motion and neck supple.   Cardiovascular:      Rate and Rhythm: Regular rhythm. Tachycardia present.      Heart sounds: Normal heart sounds. No murmur. No gallop.    Pulmonary:      Effort: Pulmonary effort is normal. No respiratory distress.      Breath sounds: Normal breath sounds. No stridor. No wheezing, rhonchi or rales.      Comments: Diminished air entry B/L bases    Chest:      Chest wall: No tenderness.   Abdominal:      General: Bowel sounds are normal. There is no distension.      Palpations: Abdomen is soft. There is no mass.      Tenderness: There is no tenderness. There is no guarding or rebound.   Musculoskeletal: Normal range of motion.         General: No swelling.      Right lower leg: No edema.      Left lower leg: No edema.   Lymphadenopathy:      Cervical: No cervical adenopathy.   Skin:     General: Skin is warm and dry.      Coloration: Skin is not jaundiced.      Findings: No rash.   Neurological:       General: No focal deficit present.      Cranial Nerves: No cranial nerve deficit.      Comments: Sedated, unarousable   Psychiatric:      Comments: Unable to assess due to sedation/intubation         Laboratory:      Recent Labs     12/05/19  0845   SODIUM 145   POTASSIUM 4.1   CHLORIDE 105   CO2 15*   GLUCOSE 104*   BUN 28*   CREATININE 2.91*   CALCIUM 8.0*     Recent Labs     12/05/19  0845   ALTSGPT 187*   ASTSGOT 169*   ALKPHOSPHAT 100*   TBILIRUBIN 0.3   GLUCOSE 104*         No results for input(s): NTPROBNP in the last 72 hours.      Recent Labs     12/05/19  0845   TROPONINT 97*       Urinalysis:    Recent Labs     12/05/19  0900   SPECGRAVITY 1.028   GLUCOSEUR Negative   KETONES Negative   NITRITE Negative   LEUKESTERAS Negative   WBCURINE 0-2*   RBCURINE 2-5*   BACTERIA Negative   EPITHELCELL Negative        Imaging:  EC-ECHOCARDIOGRAM COMPLETE W/O CONT         CT-CTA CHEST PULMONARY ARTERY W/ RECONS   Final Result      No evidence pulmonary emboli.   Diffuse patchy nodular opacities throughout both lungs predominantly within the posterior lungs. No associated pleural effusion. Differential considerations include atypical multifocal pneumonia. Edema and ARDS are also considerations. Pulmonary    hemorrhage is consideration.   Diffuse soft tissue thickening within both jessica. Adenopathy not excluded.            CT-HEAD W/O   Final Result      1.  No CT evidence of acute infarct, hemorrhage or mass.   2.  Mild to moderate sinus disease.      DX-CHEST-PORTABLE (1 VIEW)    (Results Pending)         Assessment/Plan:  I anticipate this patient will require at least two midnights for appropriate medical management, necessitating inpatient admission.    Septic shock (HCC)- (present on admission)  Assessment & Plan  -This is Septic shock Present on admission  SIRS criteria identified on my evaluation include: Tachycardia, with heart rate greater than 90 BPM and Tachypnea, with respirations greater than 20 per  minute  Source is respiratory/pneumonia  Presentation includes: Severe sepsis present, persistent hypotension after 30 ml/kg completed, and initial lactate level result is >= 4 mmol/L.   Despite appropriate fluid resuscitation with crystalloid given per sepsis guidelines, the patient remains hypotensive with systolic blood pressure less than 90 or MAP less than 65  Hemodynamic support with additional fluids and IV vasopressors as needed to maintain a SBP of 90 or MAP of 65  IV antibiotics as appropriate for source of sepsis  Reassessment: I have reassessed the patient's hemodynamic status      Acute respiratory failure with hypoxia (HCC)- (present on admission)  Assessment & Plan  - continue ventilator and respiratory support per pulmonary/critical care.   -continue antibiotics for pneumonia.     Cardiac arrest ([facility])- (present on admission)  Assessment & Plan  - likely due to severe hypoxia and sepsis. Cardiology has ruled-out cardiac cause.   - continue respiratory and hemodynamic support as above.    NSTEMI (non-ST elevated myocardial infarction) ([facility])- (present on admission)  Assessment & Plan  -Likely type II NSTEMI due to demand ischemia from cardiac arrest, septic shock.  Cardiology has ruled out cardiac cause of cardiac arrest.  -Trend troponins, and monitor on telemetry.  Check echocardiogram.    Metabolic acidosis- (present on admission)  Assessment & Plan  - he will be on aggressive IVF with bicarb drip. Monitor BMP.    Pneumonia- (present on admission)  Assessment & Plan  -Start antibiotics with IV Zosyn, and vancomycin.  He will also be on atypical coverage with doxycycline.  He is s/p FOB for BAL for cultures, will follow.  Check procalcitonin and trend.  -Continue respiratory and ventilatory support.      VTE prophylaxis: lovenox SQ

## 2019-12-05 NOTE — ASSESSMENT & PLAN NOTE
-Likely type II NSTEMI due to demand ischemia from cardiac arrest, septic shock.  Cardiology has ruled out cardiac cause of cardiac arrest.  -Trend troponins, and monitor on telemetry.  Check echocardiogram.

## 2019-12-05 NOTE — PROCEDURES
Date: 12/5/2019    Procedure:  Emergent bronchoscopy with diagnostic BAL and therapeutic aspiration    Indication: Respiratory failure, ? pneumonia    Physician:  Abhishek Kyle M.D.    Consent:  Emergent     Procedure:  This patient has developed increasing respiratory failureand required emergent intubation.  Bronchoscopy was indicated for airway evaluation and BAL to evaluate for pneumonia.  A flexible FOB was inserted through the ETT without difficulty.  All airways were evaluated to the sub-segmental level.  The airway mucosa was moderately inflamed, chunky thick tan secretions in the dependent airways were irrigated and aspirated.  No endobronchial lesions were seen.  The bronchoscope was then wedged in a segment of the RLL bronchus.  30 cc of saline was instilled with moderate return of purulent thick creamy tan BAL fluid.  The BAL specimen will be sent for appropriate culture.  Further performed wash of the RUL for cytology and cell count no immediate complications.  EBL = 0.

## 2019-12-06 PROBLEM — E83.51 HYPOCALCEMIA: Status: ACTIVE | Noted: 2019-01-01

## 2019-12-06 PROBLEM — J80 ARDS (ADULT RESPIRATORY DISTRESS SYNDROME) (HCC): Status: ACTIVE | Noted: 2019-01-01

## 2019-12-06 PROBLEM — G93.40 ENCEPHALOPATHY ACUTE: Status: ACTIVE | Noted: 2019-01-01

## 2019-12-06 NOTE — CARE PLAN
Problem: Ventilation Defect:  Goal: Ability to achieve and maintain unassisted ventilation or tolerate decreased levels of ventilator support  Outcome: PROGRESSING AS EXPECTED     Problem: Oxygenation:  Goal: Maintain adequate oxygenation dependent on patient condition  Outcome: PROGRESSING AS EXPECTED     Flolan-  APRV- no changes to vent made overnight    Adult Ventilation Update    Total Vent Days: 2    Patient Lines/Drains/Airways Status      Active Airway       Name: Placement date: Placement time: Site: Days:    Airway ETT Oral 8.0  --   --   Oral                           Static Compliance (ml / cm H2O): 58 (12/06/19 0220)      Cough: Productive (12/06/19 0220)  Sputum Amount: Moderate (12/06/19 0220)  Sputum Color: Tan (12/06/19 0220)  Sputum Consistency: Thick (12/06/19 0220)        Events/Summary/Plan: 70% FiO2, prone  (12/06/19 0220)

## 2019-12-06 NOTE — DISCHARGE PLANNING
Transfer Center:    Received call from Dr. Gonda, Intensivist reported that patient will be removed from higher level care transfer due to patient's status improving from the current treatment receiving at this time.    Placed calls to [facility] and [facility] Transfer Centers to cancel transfer requests.    Plan:  Remain available for further assistance if needed.

## 2019-12-06 NOTE — ASSESSMENT & PLAN NOTE
Continue lung protective ventilation strategies targeting a plateau pressure less than 30  Titrate FiO2 to goal SaO2 greater than 88%, PaO2 greater than 55  Status post proning and Flolan protocols

## 2019-12-06 NOTE — PROGRESS NOTES
"Pharmacy Kinetics 18 y.o. male on vancomycin day # 1 2019    Currently on Vancomycin 2000 mg iv once- Pulse Dosing  Provider specified end date: TBD    Indication for Treatment: Pneumonia    Pertinent history per medical record: Admitted on 2019 for post cardiac arrest with CT indicative of  ARDS or atypical multifocal pneumonia. Patient is flu + but no other significant PMH. Of note, patients family does report that he vapes. Patient currently requring pressure support and proning for sepsis/ARDS    Other antibiotics: Zosyn 4.5g, doxycycline 100mg    Allergies: Patient has no known allergies.     List concerns for renal function: PHUC (Serum Creatnine ~2), hypotension requiring multiple vasopressors, still making urine but declining some per RN, additional nephrotoxic antibiotics (Zosyn)    Pertinent cultures to date:   : BAL pending, blood cultures x 2 pending, urine culture pending    MRSA nares swab if pneumonia is a concern (ordered/positive/negative/n-a): ordered    Recent Labs     19  0845   WBC 7.1   NEUTSPOLYS 16.80*   BANDSSTABS 9.80     Recent Labs     19  0845 19  1617 19  1955   BUN 28* 32* 32*   CREATININE 2.91* 1.99* 2.09*   ALBUMIN 2.9*  --   --      No results for input(s): VANCOTROUGH, VANCOPEAK, VANCORANDOM in the last 72 hours.    Intake/Output Summary (Last 24 hours) at 20197  Last data filed at 2019  Gross per 24 hour   Intake 6815.33 ml   Output 2275 ml   Net 4540.33 ml      BP (!) 68/45   Pulse 95   Temp 37 °C (98.6 °F) (Bladder)   Resp 12   Ht 1.778 m (5' 10\")   Wt 77.6 kg (171 lb 1.2 oz)   SpO2 98%  Temp (24hrs), Av.4 °C (97.5 °F), Min:35.5 °C (95.9 °F), Max:37 °C (98.6 °F)      A/P   1. Vancomycin dose change: Pulse dosing due to multiple risk factors for declining renal function  2. Next vancomycin level: random level scheduled for  @ 2330  3. Goal trough: 16-20  4. Comments: Due to concerns for impaired clearance of " drug, vancomycin will be pulse dosed for now. Will monitor and dose subsequent doses based on renal function, urine output and random vancomycin levels. Please continue to monitor for signs of resolution of infection, MRSA nares swab and renal function.     Dg RamirezD

## 2019-12-06 NOTE — EEG PROGRESS NOTE
PRELIMINARY EEG:     rhythmic slowing in the vertex, spreading to the posterior regions. Concern for deep mesial posterior non convulsive seizures. Updated Dr. Gonda, who has requested to proceed with 24 hrs ceeg.

## 2019-12-06 NOTE — DISCHARGE PLANNING
Care Transition Team Discharge Planning    Anticipated Discharge Disposition: TBD    Action: Lsw received report from fellow d/c planner. Pt setup w/ Acute to Acute transfer yesterday and requested Medicaid application be fast tracked.    Later Lsw spoke to charge Rn and it appears [facility] has cancelled transport as pt has medically improved.    Barriers to Discharge: TBD    Plan: f/u w/ medical team

## 2019-12-06 NOTE — PROGRESS NOTES
Pt was moved from Prone to Supine position. Dr Gonda, RT, and RN x 6 present at time of transition. Supine was acheived without complication.    10

## 2019-12-06 NOTE — PROGRESS NOTES
Critical Care Progress Note    Date of admission  12/5/2019    Chief Complaint  18 y.o. male admitted 12/5/2019 with multiorgan failure, cardiac arrest    Hospital Course    18 y.o. male who presented 12/5/2019 postcardiac arrest.  Patient currently on full mechanical ventilatory support on probation taken from chart review and sign out.  It appears that patient had not been feeling well for several days.  URI type symptoms, general unwellness, and flulike symptoms.  His girlfriend noted him this morning to be diaphoretic and less responsive, she attempted to [place] with cold water however he subsequently became less responsive and pulseless immediate bystander CPR was initiated, reportedly patient was asystolic on EMS arrival and had a total of approximately 10 minutes of ACLS prior to ROSC.  Further he was noted to be hypoxic and hypoglycemic.  On arrival patient with labile pressure, currently requiring 2 pressors, was intubated in the field, stat CT head was negative for acute findings, CT chest with diffuse bilateral reticulonodular opacities.  Clinically patient ARDS, shock, multiorgan failure, with uncertainty to any underlying neurologic injury from cardiac arrest.  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 Flu B returned positive.  Tamiflu initiated.  Worsening oxygenation.  Given patient's hemodynamic instability may be difficult for pronating, will initiate Flolan, adjusting ventilator to improve oxygenation.  will move towards APRV if not responsive.  Further considerations would be transferred to ECMO center if no significant improvement.     - possible oxycodone use prior to sleeping night of arrest per family    Interval Problem Update  Reviewed last 24 hour events:   - improving oxygenation and ventilation   - pressors being titrated down (NE + john + vaso)   - shaking episode this morning  with associated HTN and elevated LA, ?seizure   - propofol, fent, vecuronium gtts with TOF 2:4   - Tm 38.4, WBC up to 33 with 23% bands   - sinus tach improving   - labile BP   - Na down to 134, K down to 4.6   - CVP 16-21   - improving PHUC, non-oliguric   - NGT with 300mL output   - BMS in place for liquid stool (>1000mL output)   - improving glucose   - low Ca   - LA remains elevated at 4.5   - iFlolan at maximum dose   - bicarb gtt @ 150mL/hr   - 1 L LR given overnight   - tamiflu, zosyn, vanco (d/c if MRSA nares neg), doxy (change to azithro), Bcx NGTD, sputum with rare GPC    Review of Systems  Review of Systems   Unable to perform ROS: Intubated        Vital Signs for last 24 hours   Temp:  [35.5 °C (95.9 °F)-37 °C (98.6 °F)] 37 °C (98.6 °F)  Pulse:  [] 99  Resp:  [0-36] 0  BP: ()/(23-85) 154/85  SpO2:  [68 %-100 %] 100 %    Hemodynamic parameters for last 24 hours  CVP:  [19 MM HG] 19 MM HG    Respiratory Information for the last 24 hours  Randhawa Vent Mode: APRV  Rate (breaths/min): 30  Vt Target (mL): 450  PEEP/CPAP: 12  FiO2: 40  P Support: 0  P MEAN: 29  Control VTE (exp VT): 647 30/0, 4.5/0.65, 40%    Physical Exam   Physical Exam  Vitals signs and nursing note reviewed.   Constitutional:       General: He is in acute distress.      Appearance: He is normal weight. He is ill-appearing. He is not diaphoretic.      Interventions: He is sedated and intubated.   HENT:      Head: Normocephalic and atraumatic.      Right Ear: External ear normal.      Left Ear: External ear normal.      Nose: Nose normal. No congestion.      Comments: Nasal tube in place     Mouth/Throat:      Mouth: Mucous membranes are moist.      Comments: Endotracheal tube in place  Eyes:      Conjunctiva/sclera: Conjunctivae normal.      Comments: Pupils are dilated and minimally responsive   Neck:      Musculoskeletal: Neck supple. No neck rigidity.      Comments: Left IJ central venous catheter in position without  hematoma  Cardiovascular:      Rate and Rhythm: Regular rhythm. Tachycardia present. Occasional extrasystoles are present.     Chest Wall: PMI is not displaced.      Pulses: Decreased pulses.           Radial pulses are 1+ on the right side and 1+ on the left side.      Heart sounds: Heart sounds are distant. No murmur.      Comments: Requiring vasopressor support  Pulmonary:      Effort: Tachypnea present. He is intubated.      Breath sounds: Examination of the right-upper field reveals rhonchi. Examination of the left-upper field reveals rhonchi. Examination of the right-middle field reveals rhonchi. Examination of the left-middle field reveals rhonchi. Examination of the right-lower field reveals rhonchi. Examination of the left-lower field reveals rhonchi. Rhonchi present. No decreased breath sounds or wheezing.      Comments: Plateau pressures less than 30  Abdominal:      General: Bowel sounds are normal. There is no distension.      Palpations: Abdomen is soft.      Tenderness: There is no tenderness.   Genitourinary:     Comments: Fried catheter in place  Musculoskeletal:         General: No deformity.      Right lower leg: No edema.      Left lower leg: No edema.   Skin:     General: Skin is dry.      Capillary Refill: Capillary refill takes 2 to 3 seconds.      Coloration: Skin is pale.      Findings: No rash.      Comments: Cool   Neurological:      Comments: Sedated and paralyzed   Psychiatric:      Comments: Unable to assess given current clinical condition         Medications  Current Facility-Administered Medications   Medication Dose Route Frequency Provider Last Rate Last Dose   • acetaminophen (TYLENOL) tablet 650 mg  650 mg Oral Q6HRS PRN Nasim Dahl M.D.   650 mg at 12/06/19 0048   • magnesium sulfate 6 g in  mL IVPB  6 g Intravenous Once Jeremy M Gonda, M.D.       • LORazepam (ATIVAN) injection 2 mg  2 mg Intravenous Q4HRS PRN Jeremy M Gonda, M.D.       • calcium GLUConate 2 g in NS  100 mL IVPB  2 g Intravenous Once Nasim Dahl M.D.       • Respiratory Care per Protocol   Nebulization Continuous RT Abhishek Kyle M.D.       • ipratropium-albuterol (DUONEB) nebulizer solution  3 mL Nebulization Q2HRS PRN (RT) Abhishek Kyle M.D.       • famotidine (PEPCID) tablet 20 mg  20 mg Enteral Tube DAILY Abhishek Kyle M.D.   20 mg at 12/06/19 0459    Or   • famotidine (PEPCID) injection 20 mg  20 mg Intravenous DAILY Abhishek Kyle M.D.       • senna-docusate (PERICOLACE or SENOKOT S) 8.6-50 MG per tablet 2 Tab  2 Tab Enteral Tube BID Abhishek Kyle M.D.   2 Tab at 12/06/19 0459    And   • polyethylene glycol/lytes (MIRALAX) PACKET 1 Packet  1 Packet Enteral Tube QDAY PRN Abhishek Kyle M.D.        And   • magnesium hydroxide (MILK OF MAGNESIA) suspension 30 mL  30 mL Enteral Tube QDAY PRN Abhishek Kyle M.D.        And   • bisacodyl (DULCOLAX) suppository 10 mg  10 mg Rectal QDAY PRN Abhishek Kyle M.D.       • MD Alert...ICU Electrolyte Replacement per Pharmacy   Other PHARMACY TO DOSE Abhishek Kyle M.D.       • lidocaine (XYLOCAINE) 1 % injection 1-2 mL  1-2 mL Tracheal Tube Q30 MIN PRN Abhishek Kyle M.D.       • fentaNYL (SUBLIMAZE) injection 25 mcg  25 mcg Intravenous Q HOUR PRN Abhishek Kyle M.D.        Or   • fentaNYL (SUBLIMAZE) injection 50 mcg  50 mcg Intravenous Q HOUR PRN Abhishek Kyle M.D.        Or   • fentaNYL (SUBLIMAZE) injection 100 mcg  100 mcg Intravenous Q HOUR PRN Abhishek Kyle M.D.   100 mcg at 12/06/19 0601   • insulin regular (HUMULIN R) injection 1-6 Units  1-6 Units Subcutaneous Q6HRS Abhishek Kyle M.D.   Stopped at 12/06/19 0600    And   • glucose 4 g chewable tablet 16 g  16 g Oral Q15 MIN PRN Abhishek Kyle M.D.        And   • DEXTROSE 10% BOLUS 250 mL  250 mL Intravenous Q15 MIN PRN Abhishek Kyle M.D.   250 mL at 12/05/19 1256   • MD Alert...Vancomycin per Pharmacy   Other PHARMACY TO DOSE Abhishek Kyle M.D.       •  piperacillin-tazobactam (ZOSYN) 4.5 g in  mL IVPB  4.5 g Intravenous Q8HRS Abhishek Kyle M.D. 25 mL/hr at 12/06/19 0429 4.5 g at 12/06/19 0429   • doxycycline (VIBRAMYCIN) 100 mg in  mL IVPB  100 mg Intravenous Q12HRS Abhishek Kyle M.D.   Stopped at 12/06/19 0558   • lactated ringers infusion (BOLUS): BMI less than or equal to 30  30 mL/kg Intravenous Once PRN Primo Serrano M.D.       • epoprostenol (FLOLAN) 1.5 mg in glycine diluent for flolan 50 mL for Inhalation  0.01-0.05 mcg/kg/min Inhalation Continuous Abhishek Kyle M.D. 8.1 mL/hr at 12/06/19 0225 0.05 mcg/kg/min at 12/06/19 0225   • sodium bicarbonate 8.4 % 150 mEq in D5W 1,000 mL infusion  150 mEq Intravenous Continuous Abhishek Kyle M.D. 150 mL/hr at 12/06/19 0519 150 mEq at 12/06/19 0519   • oseltamivir (TAMIFLU) capsule 30 mg  30 mg Oral BID Abhishek Kyle M.D.   30 mg at 12/05/19 2340   • heparin injection 5,000 Units  5,000 Units Subcutaneous Q8HRS Primo Serrano M.D.   5,000 Units at 12/06/19 0459   • norepinephrine (LEVOPHED) 8 mg in  mL Infusion  0-30 mcg/min Intravenous Continuous Abhishek Kyle M.D.   Stopped at 12/06/19 0601   • vasopressin (VASOSTRICT) 20 Units in  mL Infusion  0.03 Units/min Intravenous Continuous Abhishek Kyle M.D.   Stopped at 12/06/19 0616   • phenylephrine (JOSE ANTONIO-SYNEPHRINE) 40 mg in  mL Infusion  0-300 mcg/min Intravenous Continuous Abhishek Kyle M.D.   Stopped at 12/06/19 0601   • artificial tears (EYE LUBRICANT) ophth ointment 1 Application  1 Application Both Eyes Q8HRS Jeremy M Gonda, M.D.   Stopped at 12/06/19 0600   • vecuronium (NORCURON) 60 mg in D5W 500 mL Infusion  0-1.7 mcg/kg/min Intravenous Continuous Jeremy M Gonda, M.D. 20.4 mL/hr at 12/06/19 0114 0.5 mcg/kg/min at 12/06/19 0114   • vecuronium (NORCURON) injection 8.16 mg  0.1 mg/kg Intravenous Q2HRS PRN Jeremy M Gonda, M.D.       • propofol (DIPRIVAN) injection  0-80 mcg/kg/min Intravenous Continuous  Jeremy M Gonda, M.D. 19.6 mL/hr at 12/06/19 0616 40 mcg/kg/min at 12/06/19 0616   • fentaNYL (SUBLIMAZE) 50 mcg/mL in 50mL   Intravenous Continuous Jeremy M Gonda, M.D. 2 mL/hr at 12/05/19 2309 100 mcg/hr at 12/05/19 2309   • hydrocortisone sodium succinate PF (SOLU-CORTEF) 100 MG injection 50 mg  50 mg Intravenous Q6HRS Jeremy M Gonda, M.D.   50 mg at 12/06/19 0459       Fluids    Intake/Output Summary (Last 24 hours) at 12/6/2019 0728  Last data filed at 12/6/2019 0600  Gross per 24 hour   Intake 76081.17 ml   Output 3535 ml   Net 7371.17 ml       Laboratory  Recent Labs     12/05/19  0901  12/05/19  1314 12/05/19  1512 12/06/19  0441   RNAND02V 6.74*  --   --   --   --    LUNOOJ738O 84.8*  --   --   --   --    VOEIW837I 91.6*  --   --   --   --    IFEQ5EDZ 86.7*  --   --   --   --    ARTHCO3 11*  --   --   --   --    ARTBE -25*  --   --   --   --    ISTATAPH  --    < > 7.068* 7.170* 7.315*   ISTATAPCO2  --    < > 55.5* 44.1* 41.4*   ISTATAPO2  --    < > 62* 159* 196*   ISTATATCO2  --    < > 18* 17* 22   SBDCMDE5IAK  --    < > 80* 99 100*   ISTATARTHCO3  --    < > 16.0* 16.1* 21.1   ISTATARTBE  --    < > -14* -12* -5*   ISTATTEMP  --    < > 35.5 C 36.4 C 37.4 C   ISTATFIO2  --    < > 100 100 60   ISTATSPEC  --    < > Arterial Arterial Arterial   ISTATAPHTC  --    < > 7.086* 7.178* 7.309*   ZCURNEDY0PB  --    < > 56* 156* 198*    < > = values in this interval not displayed.     Recent Labs     12/05/19  0845   CPKTOTAL 144     Recent Labs     12/05/19  0845  12/05/19 1955 12/05/19  2350 12/06/19  0420   SODIUM 145   < > 137 137 136   POTASSIUM 4.1   < > 4.4 4.5 5.6*   CHLORIDE 105   < > 105 105 105   CO2 15*   < > 19* 22 19*   BUN 28*   < > 32* 29* 31*   CREATININE 2.91*   < > 2.09* 1.94* 1.89*   MAGNESIUM 2.7*  --   --   --  1.1*   PHOSPHORUS 11.7*  --   --   --  3.3   CALCIUM 8.0*   < > 6.4* 7.0* 6.6*    < > = values in this interval not displayed.     Recent Labs     12/05/19 0845  12/05/19 1955  12/05/19  2350 12/06/19  0420   ALTSGPT 187*  --   --   --   --    ASTSGOT 169*  --   --   --   --    ALKPHOSPHAT 100*  --   --   --   --    TBILIRUBIN 0.3  --   --   --   --    GLUCOSE 104*   < > 270* 188* 162*    < > = values in this interval not displayed.     Recent Labs     12/05/19  0845 12/06/19  0420   WBC 7.1 33.2*   NEUTSPOLYS 16.80* 52.60   LYMPHOCYTES 58.40* 6.00*   MONOCYTES 4.40 1.70   EOSINOPHILS 0.00 0.00   BASOPHILS 0.00 0.00   ASTSGOT 169*  --    ALTSGPT 187*  --    ALKPHOSPHAT 100*  --    TBILIRUBIN 0.3  --      Recent Labs     12/05/19  0845 12/05/19  1617 12/06/19 0420   RBC 4.66*  --  4.87   HEMOGLOBIN 14.3  --  14.9   HEMATOCRIT 46.1  --  43.8   PLATELETCT 293  --  274   PROTHROMBTM  --  20.3*  --    APTT  --  35.9  --    INR  --  1.68*  --        Imaging  X-Ray:  I have personally reviewed the images and compared with prior images. and My impression is: Improving diffuse bilateral infiltrates, left IJ central venous catheter/gastric tube in good position, endotracheal tube is too high and will be advanced  CT:    Reviewed    Assessment/Plan  ARDS (adult respiratory distress syndrome) (HCC)  Assessment & Plan  Continue lung protective ventilation strategies targeting a plateau pressure less than 30  Titrate FiO2 to goal SaO2 greater than 88%, PaO2 greater than 55  Continue Flolan  Continue proning per protocol    Encephalopathy acute  Assessment & Plan  Concerning for possible anoxic brain injury versus seizure versus toxic/metabolic  EEG, neurology consultation  MRI brain  Limit sedatives as able with close neurologic monitoring  [facility]  Donor network consultation    Septic shock (HCC)- (present on admission)  Assessment & Plan  This is Septic shock Present on admission -improving  SIRS criteria identified on my evaluation include: Hypothermia, with temperature less than 96.8 deg F and Tachycardia, with heart rate greater than 90 BPM  Source is pulmonary  Presentation includes:  Severe sepsis present, persistent hypotension after 30 ml/kg completed, and initial lactate level result is >= 4 mmol/L.   Despite appropriate fluid resuscitation with crystalloid given per sepsis guidelines, the patient remains hypotensive with systolic blood pressure less than 90 or MAP less than 65  Hemodynamic support with additional fluids and IV vasopressors as needed to maintain a SBP of 90 or MAP of 65  IV antibiotics as appropriate for source of sepsis  Reassessment: I have reassessed the patient's hemodynamic status    Status post sepsis protocol/resuscitation  Continue broad-spectrum antibiotics with plans to discontinue vancomycin if MRSA nares are negative, change doxycycline to azithromycin  Follow-up on cultures  Continue to titrate pressors to maintain mean arterial pressure greater than 65, trend CVP, lactic acid level, urine output    Acute respiratory failure with hypoxia ([facility])- (present on admission)  Assessment & Plan  Intubated 12/5  Continue full mechanical ventilatory support using APRV mode titrating FiO2 to goal SaO2 greater than 88%/PaO2 greater than 55  Continue paralysis, sedation  RT/O2 protocol  Discontinue IV fluids with plans to eventually begin diuretic  Continue inhaled Flolan  Lung protective ventilation strategy in the setting of severe ARDS    Cardiac arrest ([facility])- (present on admission)  Assessment & Plan  Suspect hypoxia induced  Continue supportive care  Echocardiography pending  Continuous telemetry monitoring    PHUC (acute kidney injury) ([facility])  Assessment & Plan  Suspect ATN/prerenal -improving, nonoliguric  Avoid nephrotoxins  Monitor creatinine, urine output, electrolytes closely    NSTEMI (non-ST elevated myocardial infarction) ([facility])- (present on admission)  Assessment & Plan  Secondary to demand ischemia  No further intervention    Metabolic acidosis- (present on admission)  Assessment & Plan  Secondary to systemic hypoperfusion fusion and cardiac arrest -improving  slowly  Trend lactic acid levels  Continue bicarb infusion  Serial BMP    Pneumonia- (present on admission)  Assessment & Plan  Suspicious for viral etiology with possible bacterial superinfection  Continue antiviral and antibiotic as described above  Follow-up on blood and sputum cultures    Hypocalcemia  Assessment & Plan  Repletion and monitor closely       VTE:  Heparin  Ulcer: H2 Antagonist  Lines: Central Line  Ongoing indication addressed, Arterial Line  Ongoing indication addressed and Fried Catheter  Ongoing indication addressed    I have performed a physical exam and reviewed and updated ROS and Plan today (12/6/2019). In review of yesterday's note (12/5/2019), there are no changes except as documented above.     Discussed patient condition and risk of morbidity and/or mortality with Family, RN, RT, Pharmacy, , Charge nurse / hot rounds, neurology and Donor network services  The patient remains critically ill.  Critical care time = 154 minutes in directly providing and coordinating critical care and extensive data review.  No time overlap and excludes procedures.    Addendum: 1615  Both a CT head without contrast and MRI brain without contrast were performed on patient.  Unfortunately both revealed evidence of severe anoxic brain injury with herniation.  Patient has remained on a propofol drip which we will discontinue as well as the continues EEG monitoring.  I will give a dose of IV mannitol 1 g/kg to decrease intracranial pressure and start the patient on a 3% hypertonic saline.  I informed the patient's mother and grandmother of his critical condition including his anoxic brain injury and plan to meet with them again tomorrow with neurology.  Organ donation has been updated as patient is a registered donor.  I will continue the Keppra for now as well.

## 2019-12-06 NOTE — PROGRESS NOTES
Dr Dahl made aware of critical values:    Lactic 4.6  Troponin 132      No orders received or interventions.    Also made aware of decreased urine output, 35mL over the past 2 hours.    No orders received.

## 2019-12-06 NOTE — PROGRESS NOTES
2 RN skin check complete.   Devices in place [place] for ET tube, Fried catheter, Left IJ Central Line, .  Skin assessed under devices all devices.  Confirmed pressure ulcers found on Patient has non-blanching purple spot to right lower lip.Pictures uploaded. And wound consult placed.  New potential pressure ulcers noted on lower lip. Wound consult placed yes.  The following interventions in place Mepilex to all bony prominences, Pillows for repositioning.

## 2019-12-06 NOTE — ASSESSMENT & PLAN NOTE
Consistent with severe anoxic brain injury with concerns for probable brain death  Neurology consulted  Performing apnea exam today with adjunctive tests if unable to tolerate  Limit sedatives as able with close neurologic monitoring  Continue empiric Keppra  Continue hypertonic saline drip titrating to sodium between 145 and 155 for ICP management  Donor network consulted, family is requesting donation if patient is

## 2019-12-06 NOTE — CONSULTS
Hospital Neurology Consult:    Referring Physician: Primo Serrano M.D.    Reason for consultation: Possible seizures.    HPI: Jonny French is a 18 y.o. male with no history of systemic illness and recent infection of influenza B with resulting multiorgan failure, septic shock, ARDS cardiac arrest (10 minutes until return of spontaneous circulation), previously on pressor support with acidosis, hypoxia and consulted to neurology for possible seizure activity/prognostication.  History obtained from chart as the patient is intubated and no family member available for corollary information.  The patient had been off anesthetics for some time and had been having difficulty waking up.  EEG was obtained which showed a burst suppression type pattern with some rhythmic vertex waves which were thought to be potentially epileptogenic and neurology was consulted.  Unable to obtain review of systems.    ROS:     Unable to obtain    Past Medical History:    has a past medical history of Meningitis and Seizure disorder (HCC).    FHx:  Unable to obtain    SHx:   reports that he has never smoked. He has never used smokeless tobacco. He reports that he does not drink alcohol or use drugs.    Allergies:  No Known Allergies    Medications:    Current Facility-Administered Medications:   •  acetaminophen (TYLENOL) tablet 650 mg, 650 mg, Oral, Q6HRS PRN, Nasim Dahl M.D., 650 mg at 12/06/19 0048  •  LORazepam (ATIVAN) injection 2 mg, 2 mg, Intravenous, Q4HRS PRN, Jeremy M Gonda, M.D.  •  oseltamivir (TAMIFLU) capsule 75 mg, 75 mg, Oral, BID, Jeremy M Gonda, M.D., 75 mg at 12/06/19 1201  •  [START ON 12/7/2019] azithromycin (ZITHROMAX) 500 mg in D5W 250 mL IVPB, 500 mg, Intravenous, Q24HRS, Jeremy M Gonda, M.D.  •  levETIRAcetam (KEPPRA) 1,000 mg in  mL IVPB, 1,000 mg, Intravenous, Q12HRS, Jeremy M Gonda, M.D., Stopped at 12/06/19 1215  •  Respiratory Care per Protocol, , Nebulization, Continuous RT, Abhishek Kyle,  M.D.  •  ipratropium-albuterol (DUONEB) nebulizer solution, 3 mL, Nebulization, Q2HRS PRN (RT), Abhishek Kyel M.D.  •  famotidine (PEPCID) tablet 20 mg, 20 mg, Enteral Tube, DAILY, 20 mg at 12/06/19 0459 **OR** famotidine (PEPCID) injection 20 mg, 20 mg, Intravenous, DAILY, Abhishek Kyle M.D.  •  senna-docusate (PERICOLACE or SENOKOT S) 8.6-50 MG per tablet 2 Tab, 2 Tab, Enteral Tube, BID, 2 Tab at 12/06/19 0459 **AND** polyethylene glycol/lytes (MIRALAX) PACKET 1 Packet, 1 Packet, Enteral Tube, QDAY PRN **AND** magnesium hydroxide (MILK OF MAGNESIA) suspension 30 mL, 30 mL, Enteral Tube, QDAY PRN **AND** bisacodyl (DULCOLAX) suppository 10 mg, 10 mg, Rectal, QDAY PRN, Abhihsek Kyle M.D.  •  MD Alert...ICU Electrolyte Replacement per Pharmacy, , Other, PHARMACY TO DOSE, Abhishek Kyle M.D.  •  lidocaine (XYLOCAINE) 1 % injection 1-2 mL, 1-2 mL, Tracheal Tube, Q30 MIN PRN, Abhishek Kyle M.D.  •  fentaNYL (SUBLIMAZE) injection 25 mcg, 25 mcg, Intravenous, Q HOUR PRN **OR** fentaNYL (SUBLIMAZE) injection 50 mcg, 50 mcg, Intravenous, Q HOUR PRN **OR** fentaNYL (SUBLIMAZE) injection 100 mcg, 100 mcg, Intravenous, Q HOUR PRN, Abhishek Kyle M.D., 100 mcg at 12/06/19 0601  •  insulin regular (HUMULIN R) injection 1-6 Units, 1-6 Units, Subcutaneous, Q6HRS, Stopped at 12/06/19 0600 **AND** Accu-Chek Q6 if NPO, , , Q6H **AND** NOTIFY MD and PharmD, , , Once **AND** glucose 4 g chewable tablet 16 g, 16 g, Oral, Q15 MIN PRN **AND** DEXTROSE 10% BOLUS 250 mL, 250 mL, Intravenous, Q15 MIN PRN, Abhishek Kyle M.D., 250 mL at 12/05/19 1256  •  MD Alert...Vancomycin per Pharmacy, , Other, PHARMACY TO DOSE, Abhishek Kyle M.D.  •  [COMPLETED] piperacillin-tazobactam (ZOSYN) 4.5 g in  mL IVPB, 4.5 g, Intravenous, Once, Stopped at 12/05/19 1153 **AND** piperacillin-tazobactam (ZOSYN) 4.5 g in  mL IVPB, 4.5 g, Intravenous, Q8HRS, Abhishek Kyle M.D., Stopped at 12/06/19 1411  •  lactated ringers  infusion (BOLUS): BMI less than or equal to 30, 30 mL/kg, Intravenous, Once PRN, Primo Serrano M.D.  •  epoprostenol (FLOLAN) 1.5 mg in glycine diluent for flolan 50 mL for Inhalation, 0.01-0.05 mcg/kg/min, Inhalation, Continuous, Abhishek Kyle M.D., Last Rate: 8.1 mL/hr at 12/06/19 0817, 0.05 mcg/kg/min at 12/06/19 0817  •  sodium bicarbonate 8.4 % 150 mEq in D5W 1,000 mL infusion, 150 mEq, Intravenous, Continuous, Abhishek Kyle M.D., Last Rate: 150 mL/hr at 12/06/19 0519, 150 mEq at 12/06/19 0519  •  heparin injection 5,000 Units, 5,000 Units, Subcutaneous, Q8HRS, Primo Serrano M.D., 5,000 Units at 12/06/19 0459  •  norepinephrine (LEVOPHED) 8 mg in  mL Infusion, 0-30 mcg/min, Intravenous, Continuous, Abhishek Kyle M.D., Stopped at 12/06/19 1239  •  vasopressin (VASOSTRICT) 20 Units in  mL Infusion, 0.03 Units/min, Intravenous, Continuous, Abhishek Kyle M.D., Stopped at 12/06/19 0616  •  phenylephrine (JOSE ANTONIO-SYNEPHRINE) 40 mg in  mL Infusion, 0-300 mcg/min, Intravenous, Continuous, Abhishek Kyle M.D., Stopped at 12/06/19 0601  •  artificial tears (EYE LUBRICANT) ophth ointment 1 Application, 1 Application, Both Eyes, Q8HRS, Jeremy M Gonda, M.D., Stopped at 12/06/19 0600  •  vecuronium (NORCURON) 60 mg in D5W 500 mL Infusion, 0-1.7 mcg/kg/min, Intravenous, Continuous, Jeremy M Gonda, M.D., Stopped at 12/06/19 0955  •  vecuronium (NORCURON) injection 8.16 mg, 0.1 mg/kg, Intravenous, Q2HRS PRN, Jeremy M Gonda, M.D.  •  propofol (DIPRIVAN) injection, 0-80 mcg/kg/min, Intravenous, Continuous, Stopped at 12/06/19 0955 **AND** Triglycerides Starting now and then Every 3 Days, , , Every 3 Days (0300), Jeremy M Gonda, M.D.  •  fentaNYL (SUBLIMAZE) 50 mcg/mL in 50mL, , Intravenous, Continuous, Jeremy M Gonda, M.D., Last Rate: 4 mL/hr at 12/06/19 0839, 200 mcg/hr at 12/06/19 0839  •  hydrocortisone sodium succinate PF (SOLU-CORTEF) 100 MG injection 50 mg, 50 mg, Intravenous, Q6HRS, Lj BERNAL  Gonda, M.D., 50 mg at 12/06/19 1213    Vitals:   Vitals:    12/06/19 1000 12/06/19 1015 12/06/19 1051 12/06/19 1226   BP:  106/73     Pulse: 94 94 96 96   Resp: 12 12     Temp: 36.7 °C (98.1 °F)      TempSrc: Bladder      SpO2: 100% 100% 100% 100%   Weight:       Height:           Labs:  Lab Results   Component Value Date/Time    PROTHROMBTM 20.3 (H) 12/05/2019 04:17 PM    INR 1.68 (H) 12/05/2019 04:17 PM      Lab Results   Component Value Date/Time    WBC 33.2 (HH) 12/06/2019 04:20 AM    RBC 4.87 12/06/2019 04:20 AM    HEMOGLOBIN 14.9 12/06/2019 04:20 AM    HEMATOCRIT 43.8 12/06/2019 04:20 AM    MCV 89.9 12/06/2019 04:20 AM    MCH 30.6 12/06/2019 04:20 AM    MCHC 34.0 12/06/2019 04:20 AM    MPV 11.8 12/06/2019 04:20 AM    NEUTSPOLYS 52.60 12/06/2019 04:20 AM    LYMPHOCYTES 6.00 (L) 12/06/2019 04:20 AM    MONOCYTES 1.70 12/06/2019 04:20 AM    EOSINOPHILS 0.00 12/06/2019 04:20 AM    BASOPHILS 0.00 12/06/2019 04:20 AM    HYPOCHROMIA 1+ 07/31/2012 08:45 PM    ANISOCYTOSIS 1+ 12/06/2019 04:20 AM      Lab Results   Component Value Date/Time    SODIUM 136 12/06/2019 11:54 AM    POTASSIUM 4.5 12/06/2019 11:54 AM    CHLORIDE 100 12/06/2019 11:54 AM    CO2 27 12/06/2019 11:54 AM    GLUCOSE 162 (H) 12/06/2019 11:54 AM    BUN 30 (H) 12/06/2019 11:54 AM    CREATININE 1.71 (H) 12/06/2019 11:54 AM      Lab Results   Component Value Date/Time    TRIGLYCERIDE 166 (H) 12/05/2019 04:17 PM       Lab Results   Component Value Date/Time    ALKPHOSPHAT 100 (H) 12/05/2019 08:45 AM    ASTSGOT 169 (H) 12/05/2019 08:45 AM    ALTSGPT 187 (H) 12/05/2019 08:45 AM    TBILIRUBIN 0.3 12/05/2019 08:45 AM      Imaging/Testing:  CT head on 12/5/2019 without evidence of acute stroke or hemorrhage.    EEG On 12/16/19 personally reviewed with a burst suppression pattern and rhythmic vertex delta c/w pulse artifact.    Physical Exam:     General: 17 y/o male intubated  Cardio: Deferred  Pulm: Deferred  Skin: Warm, dry, no rashes or lesions   Psychiatric:  Unable to assess  HEENT: Atraumatic head, normal sclera and conjunctiva, moist oral mucosa. No lid lag.  Abdomen: Soft, non tender. No masses or hepatosplenomegaly.    Neurologic:  Mental Status: GCS 3T (E1, M1, V1T)   Cranial Nerves: Right pupillary asymmetry, pupils unreactive to light.  Face is symmetric.  Oculocephalic reflex absent.  Motor: Normal muscle tone and bulk.  No movement observed.  Reflexes: Deferred  Coordination: Unable to assess  Sensation: Does not withdraw to noxious stimulus  Gait/Station: Unable to assess    Assessment/Plan:    Jonny French is a 18 y.o. male with no history of systemic illness and recent infection of influenza B with resulting multiorgan failure, septic shock, ARDS cardiac arrest (10 minutes until return of spontaneous circulation), previously on pressor support with acidosis, hypoxia and consulted to neurology for possible seizure activity/prognostication.  The patient's EEG is overall of poor amplitude with some right to left asymmetry with burst of activity.  There is presence of continuous delta in the vertex region which is out of phase and could be consistent with a pulse artifact.  Nevertheless, there is a possibility of seizures.  Given the patient's blown pupil on the right there is concern for possible herniation/anoxic brain injury.  In this regard, I would recommend an MRI of the brain without contrast for further prognostication.    Plan:  1.  Continue Keppra  2.  CT head without contrast ordered  3.  MRI of the brain without contrast when able  4.  Video EEG.  If by 12/7/2019 there has been no convincing seizure activity will discontinue then.  5.  Plan discussed with consulting physician and patient's nurse.       Tristan Block M.D., Diplomat of the American Board of Psychiatry and Neurology  Diplomat of [facility]   Assistant Clinical Professor, [facility] Neurology Consultant

## 2019-12-06 NOTE — PROCEDURES
VIDEO ELECTROENCEPHALOGRAM REPORT      Referring provider: Dr. Gonda.     DOS: 12/6/2019 (total recording of 3 hours and 18 minutes).     INDICATION:  Jonny French 18 y.o. male presenting with cardiac arrest, altered mental status.     CURRENT ANTIEPILEPTIC REGIMEN: Propofol infusion, Levetiracetam.     TECHNIQUE: 30 channel video electroencephalogram (EEG) was performed in accordance with the international 10-20 system. The study was reviewed in bipolar and referential montages. The recording examined a sedated and/or comatose patient.     DESCRIPTION OF THE RECORD:  Intermittently, a mild burst suppression pattern is noted. The background shows generalized suppression. There is rhythmic slowing in the vertex region, which may suggest underlying, deep, mesial non convulsive seizures.     ACTIVATION PROCEDURES:   Not performed.     EKG: sampling of the EKG recording demonstrated sinus rhythm.     EVENTS:  None marked.     INTERPRETATION:  This is an abnormal video EEG recording in a sedated and/or comatose patient. Intermittently, a mild burst suppression pattern is noted. The background shows generalized suppression. There is rhythmic slowing in the vertex region, which may suggest underlying, deep, mesial non convulsive seizures.  The findings suggest structural abnormality and cortical irritability. Clinical and radiological correlation is recommended.    Updates provided to Dr. Gonda, and Dr. Block.         Anurag Javier MD   Epilepsy and Neurodiagnostics.   Clinical  of Neurology [facility] of Medicine.   Diplomate in Neurology, Epilepsy, and Electrodiagnostic Medicine.   Office: [phone]  Fax: [phone]

## 2019-12-06 NOTE — PROGRESS NOTES
Set up for MRI  Propofol running at 30 mcg/kg/min at 14.4 ml/hr   Levophed at 10 mcg/min at dose concentration of 32 mcg/ml currently on standby.

## 2019-12-06 NOTE — CARE PLAN
Problem: Knowledge Deficit  Goal: Knowledge of the prescribed therapeutic regimen will improve  Outcome: PROGRESSING AS EXPECTED  Intervention: Discuss information regarding therpeutic regimen and document in education  Note:   Discussed all care with family.      Problem: Fluid Volume:  Goal: Will maintain balanced intake and output  Outcome: PROGRESSING AS EXPECTED  Intervention: Monitor, educate, and encourage compliance with therapeutic intake of liquids  Note:   Strict I and O taken

## 2019-12-06 NOTE — PROGRESS NOTES
Pharmacy Kinetics Addendum:    18 y.o. male on vancomycin day # 2 12/6/2019    Currently on Vancomycin: Pulse Dosing (1600 mg (20mg/kg) once @ 0100)  Provider specified end date: TBD    Indication for Treatment: Pneumonia    Recent Labs     12/05/19  0845 12/05/19  1617 12/05/19  1955 12/05/19  2350   BUN 28* 32* 32* 29*   CREATININE 2.91* 1.99* 2.09* 1.94*   ALBUMIN 2.9*  --   --   --      Recent Labs     12/05/19  2350   VANCORANDOM 16.6       A/P   1. Vancomycin dose change: No change, continue pulse dosing  2. Next vancomycin level: random level 12h post dose (12/6 @ 1300)  3. Goal trough: 16-20  4. Comments: Please monitor renal function and urine output as well as MRSA nares swab for dose changes or de-escalation. 12h post dose random level scheduled to assess clearance.     Vivi Staton, DgD

## 2019-12-07 PROBLEM — D69.6 THROMBOCYTOPENIA (HCC): Status: ACTIVE | Noted: 2019-01-01

## 2019-12-07 PROBLEM — E87.6 HYPOKALEMIA: Status: ACTIVE | Noted: 2019-01-01

## 2019-12-07 PROBLEM — R79.89 ELEVATED TROPONIN: Status: ACTIVE | Noted: 2019-01-01

## 2019-12-07 NOTE — PROGRESS NOTES
Critical Care Progress Note    Date of admission  12/5/2019    Chief Complaint  18 y.o. male admitted 12/5/2019 with multiorgan failure, cardiac arrest    Hospital Course    18 y.o. male who presented 12/5/2019 postcardiac arrest.  Patient currently on full mechanical ventilatory support on probation taken from chart review and sign out.  It appears that patient had not been feeling well for several days.  URI type symptoms, general unwellness, and flulike symptoms.  His girlfriend noted him this morning to be diaphoretic and less responsive, she attempted to [place] with cold water however he subsequently became less responsive and pulseless immediate bystander CPR was initiated, reportedly patient was asystolic on EMS arrival and had a total of approximately 10 minutes of ACLS prior to ROSC.  Further he was noted to be hypoxic and hypoglycemic.  On arrival patient with labile pressure, currently requiring 2 pressors, was intubated in the field, stat CT head was negative for acute findings, CT chest with diffuse bilateral reticulonodular opacities.  Clinically patient ARDS, shock, multiorgan failure, with uncertainty to any underlying neurologic injury from cardiac arrest.  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 Flu B returned positive.  Tamiflu initiated.  Worsening oxygenation.  Given patient's hemodynamic instability may be difficult for pronating, will initiate Flolan, adjusting ventilator to improve oxygenation.  will move towards APRV if not responsive.  Further considerations would be transferred to ECMO center if no significant improvement.     - possible oxycodone use prior to sleeping night of arrest per family    Interval Problem Update  Reviewed last 24 hour events:   - CT and MRI brain showing evidence of anoxic brain injury and herniation   - back on NE gtt this morning   - WBC down from  33 to 20   - no neuro responsiveness this morning, concern for brain death   - plts down to 138   - improving oxygenation, ventilation   - on 3% with Na up to 145   - low K    - unchanged PHUC    Review of Systems  Review of Systems   Unable to perform ROS: Intubated        Vital Signs for last 24 hours   Temp:  [34.6 °C (94.3 °F)-37.7 °C (99.9 °F)] 34.6 °C (94.3 °F)  Pulse:  [] 91  Resp:  [0-27] 25  BP: ()/() 101/62  SpO2:  [91 %-100 %] 100 %    Hemodynamic parameters for last 24 hours  CVP:  [5 MM HG-300 MM HG] 15 MM HG    Respiratory Information for the last 24 hours  Randhawa Vent Mode: (P) APVCMV  Rate (breaths/min): (P) 25  Vt Target (mL): (P) 440  PEEP/CPAP: (P) 16  FiO2: (P) 30  P MEAN: (P) 20  Control VTE (exp VT): (P) 440     Physical Exam   Physical Exam  Vitals signs and nursing note reviewed.   Constitutional:       Appearance: Normal appearance. He is normal weight. He is ill-appearing.      Interventions: He is intubated.   HENT:      Head: Normocephalic and atraumatic.      Right Ear: Ear canal normal.      Left Ear: Ear canal normal.      Nose: Nose normal. No rhinorrhea.      Comments: Nasal tube in place     Mouth/Throat:      Mouth: Mucous membranes are moist.      Comments: Endotracheal tube in place  Eyes:      Conjunctiva/sclera: Conjunctivae normal.      Comments: Fixed and dilated pupils bilaterally   Neck:      Comments: Left IJ central venous catheter in position without hematoma  Cardiovascular:      Rate and Rhythm: Regular rhythm. Tachycardia present. Occasional extrasystoles are present.     Chest Wall: PMI is not displaced.      Pulses: Decreased pulses.           Radial pulses are 1+ on the right side and 1+ on the left side.      Heart sounds: Heart sounds not distant. No murmur. No friction rub.      Comments: Requiring vasopressor support again this morning  Pulmonary:      Effort: Pulmonary effort is normal. No tachypnea or accessory muscle usage. He is  intubated.      Breath sounds: Examination of the right-middle field reveals rhonchi. Examination of the left-middle field reveals rhonchi. Examination of the right-lower field reveals rhonchi. Examination of the left-lower field reveals rhonchi. Rhonchi present. No decreased breath sounds or rales.      Comments: Does not overbreathing ventilator when I dropped the rate down to 5 and monitor for 60 seconds  Abdominal:      General: Bowel sounds are normal. There is no distension.      Palpations: Abdomen is soft. There is no mass.   Genitourinary:     Comments: Fried catheter in place  Musculoskeletal:         General: No swelling or signs of injury.      Right lower leg: No edema.      Left lower leg: No edema.   Lymphadenopathy:      Cervical: No cervical adenopathy.   Skin:     General: Skin is warm and dry.      Capillary Refill: Capillary refill takes 2 to 3 seconds.      Coloration: Skin is pale.      Findings: No rash.   Neurological:      Comments: No cranial nerve responsiveness including gag, cough, corneal, no motor response to deep painful stimulus either peripherally nor centrally, abnormal oculocephalic and cold caloric   Psychiatric:      Comments: Unable to assess given current clinical condition         Medications  Current Facility-Administered Medications   Medication Dose Route Frequency Provider Last Rate Last Dose   • acetaminophen (TYLENOL) tablet 650 mg  650 mg Oral Q6HRS PRN Nasim Dahl M.D.   650 mg at 12/06/19 0048   • LORazepam (ATIVAN) injection 2 mg  2 mg Intravenous Q4HRS PRN Jeremy M Gonda, M.D.       • oseltamivir (TAMIFLU) capsule 75 mg  75 mg Oral BID Jeremy M Gonda, M.D.   75 mg at 12/06/19 2255   • azithromycin (ZITHROMAX) 500 mg in D5W 250 mL IVPB  500 mg Intravenous Q24HRS Jeremy M Gonda, M.D. 250 mL/hr at 12/07/19 0547 500 mg at 12/07/19 0547   • levETIRAcetam (KEPPRA) 1,000 mg in  mL IVPB  1,000 mg Intravenous Q12HRS Jeremy M Gonda, M.D.   Stopped at 12/07/19  0458   • 3% sodium chloride (HYPERTONIC SALINE) 500mL infusion 500 mL  500 mL Intravenous Continuous Jeremy M Gonda, M.D. 30 mL/hr at 12/07/19 0459     • sodium chloride 200 mEq in empty bag 50 mL ivpb  200 mEq Intravenous Q6HRS PRN Jeremy M Gonda, M.D.       • sodium bicarbonate 8.4 % 150 mEq in D5W 1,000 mL infusion  150 mEq Intravenous Continuous Jeremy M Gonda, M.D. 83 mL/hr at 12/07/19 0639 150 mEq at 12/07/19 0639   • labetalol (NORMODYNE/TRANDATE) injection 10 mg  10 mg Intravenous Q4HRS PRN Darius Beaulieu M.D.   10 mg at 12/06/19 2145   • Respiratory Care per Protocol   Nebulization Continuous RT Abhishek Kyle M.D.       • ipratropium-albuterol (DUONEB) nebulizer solution  3 mL Nebulization Q2HRS PRN (RT) Abhishek Kyle M.D.       • famotidine (PEPCID) tablet 20 mg  20 mg Enteral Tube DAILY Abhishek Kyle M.D.   20 mg at 12/07/19 0441    Or   • famotidine (PEPCID) injection 20 mg  20 mg Intravenous DAILY Abhishek Kyle M.D.       • senna-docusate (PERICOLACE or SENOKOT S) 8.6-50 MG per tablet 2 Tab  2 Tab Enteral Tube BID Abhishek Kyle M.D.   2 Tab at 12/07/19 0600    And   • polyethylene glycol/lytes (MIRALAX) PACKET 1 Packet  1 Packet Enteral Tube QDAY PRN Abhishek Kyle M.D.        And   • magnesium hydroxide (MILK OF MAGNESIA) suspension 30 mL  30 mL Enteral Tube QDAY PRN Abhishek Kyle M.D.        And   • bisacodyl (DULCOLAX) suppository 10 mg  10 mg Rectal QDAY PRN Abhishek Kyle M.D.       • MD Alert...ICU Electrolyte Replacement per Pharmacy   Other PHARMACY TO DOSE Abhishek Kyle M.D.       • lidocaine (XYLOCAINE) 1 % injection 1-2 mL  1-2 mL Tracheal Tube Q30 MIN PRN Abhishek Kyle M.D.       • fentaNYL (SUBLIMAZE) injection 25 mcg  25 mcg Intravenous Q HOUR PRN Abhishek Kyle M.D.        Or   • fentaNYL (SUBLIMAZE) injection 50 mcg  50 mcg Intravenous Q HOUR PRN Abhishek Kyle M.D.        Or   • fentaNYL (SUBLIMAZE) injection 100 mcg  100 mcg Intravenous Q HOUR PRN  Abhishek Kyle M.D.   100 mcg at 12/06/19 0601   • insulin regular (HUMULIN R) injection 1-6 Units  1-6 Units Subcutaneous Q6HRS Abhishek Kyle M.D.   Stopped at 12/06/19 0600    And   • glucose 4 g chewable tablet 16 g  16 g Oral Q15 MIN PRN Abhishek Kyle M.D.        And   • DEXTROSE 10% BOLUS 250 mL  250 mL Intravenous Q15 MIN PRN Abhishek Kyle M.D.   250 mL at 12/05/19 1256   • piperacillin-tazobactam (ZOSYN) 4.5 g in  mL IVPB  4.5 g Intravenous Q8HRS Abhishek Kyle M.D. 25 mL/hr at 12/07/19 0435 4.5 g at 12/07/19 0435   • lactated ringers infusion (BOLUS): BMI less than or equal to 30  30 mL/kg Intravenous Once PRN Primo Serrano M.D.       • epoprostenol (FLOLAN) 1.5 mg in glycine diluent for flolan 50 mL for Inhalation  0.01-0.05 mcg/kg/min Inhalation Continuous Abhishek Kyle M.D. 8.1 mL/hr at 12/06/19 1412 0.05 mcg/kg/min at 12/06/19 1412   • heparin injection 5,000 Units  5,000 Units Subcutaneous Q8HRS Primo Serrano M.D.   5,000 Units at 12/07/19 0441   • norepinephrine (LEVOPHED) 8 mg in  mL Infusion  0-30 mcg/min Intravenous Continuous Abhishek Kyle M.D.   Stopped at 12/06/19 1239   • artificial tears (EYE LUBRICANT) ophth ointment 1 Application  1 Application Both Eyes Q8HRS Jeremy M Gonda, M.D.   1 Application at 12/07/19 0501   • vecuronium (NORCURON) injection 8.16 mg  0.1 mg/kg Intravenous Q2HRS PRN Jeremy M Gonda, M.D.       • propofol (DIPRIVAN) injection  0-80 mcg/kg/min Intravenous Continuous Jeremy M Gonda, M.D.   Stopped at 12/06/19 1651       Fluids    Intake/Output Summary (Last 24 hours) at 12/7/2019 0724  Last data filed at 12/7/2019 0600  Gross per 24 hour   Intake 3904.66 ml   Output 3665 ml   Net 239.66 ml       Laboratory  Recent Labs     12/05/19  0901  12/06/19  0441 12/06/19  1330 12/07/19  0420   IYLSQ09A 6.74*  --   --   --   --    DAHZOZ772B 84.8*  --   --   --   --    EKEDY272R 91.6*  --   --   --   --    OSVU6GJN 86.7*  --   --   --   --     ARTHCO3 11*  --   --   --   --    ARTBE -25*  --   --   --   --    ISTATAPH  --    < > 7.315* 7.376* 7.506*   ISTATAPCO2  --    < > 41.4* 39.1* 35.3   ISTATAPO2  --    < > 196* 148* 147*   ISTATATCO2  --    < > 22 24 29   JEJLEBE5YEA  --    < > 100* 99 99   ISTATARTHCO3  --    < > 21.1 22.9 27.9*   ISTATARTBE  --    < > -5* -2 5*   ISTATTEMP  --    < > 37.4 C 36.0 C 37.0 C   ISTATFIO2  --    < > 60 40 40   ISTATSPEC  --    < > Arterial Arterial Arterial   ISTATAPHTC  --    < > 7.309* 7.391* 7.506*   SUKDNPTK4IO  --    < > 198* 142* 147*    < > = values in this interval not displayed.     Recent Labs     12/05/19  0845   CPKTOTAL 144     Recent Labs     12/05/19  0845  12/06/19  0420  12/06/19 1637 12/06/19 2238 12/07/19  0415   SODIUM 145   < > 136   < > 139 139 145   POTASSIUM 4.1   < > 5.6*   < > 4.1 3.9 3.4*   CHLORIDE 105   < > 105   < > 103 102 104   CO2 15*   < > 19*   < > 21 23 27   BUN 28*   < > 31*   < > 32* 32* 32*   CREATININE 2.91*   < > 1.89*   < > 1.77* 1.76* 1.79*   MAGNESIUM 2.7*  --  1.1*  --   --   --  2.2   PHOSPHORUS 11.7*  --  3.3  --   --   --  3.2   CALCIUM 8.0*   < > 6.6*   < > 7.5* 7.4* 7.6*    < > = values in this interval not displayed.     Recent Labs     12/05/19  0845  12/06/19  1637 12/06/19 2238 12/07/19  0415   ALTSGPT 187*  --   --   --   --    ASTSGOT 169*  --   --   --   --    ALKPHOSPHAT 100*  --   --   --   --    TBILIRUBIN 0.3  --   --   --   --    GLUCOSE 104*   < > 123* 141* 105*    < > = values in this interval not displayed.     Recent Labs     12/05/19  0845 12/06/19 0420 12/07/19 0415   WBC 7.1 33.2* 19.9*   NEUTSPOLYS 16.80* 52.60 89.60*   LYMPHOCYTES 58.40* 6.00* 4.30*   MONOCYTES 4.40 1.70 0.90   EOSINOPHILS 0.00 0.00 0.00   BASOPHILS 0.00 0.00 0.00   ASTSGOT 169*  --   --    ALTSGPT 187*  --   --    ALKPHOSPHAT 100*  --   --    TBILIRUBIN 0.3  --   --      Recent Labs     12/05/19 0845 12/05/19  1617 12/06/19 0420 12/07/19 0415   RBC 4.66*  --  4.87 4.05*    HEMOGLOBIN 14.3  --  14.9 12.1*   HEMATOCRIT 46.1  --  43.8 35.4*   PLATELETCT 293  --  274 138*   PROTHROMBTM  --  20.3*  --   --    APTT  --  35.9  --   --    INR  --  1.68*  --   --        Imaging  X-Ray:  I have personally reviewed the images and compared with prior images. and My impression is: Improving diffuse bilateral infiltrates, endotracheal tube 7.2 cm above the cally, gastric tube in good position, left IJ central venous catheter also well positioned  CT:    Reviewed  Ultrasound:  Reviewed  MRI:   Reviewed    Assessment/Plan  ARDS (adult respiratory distress syndrome) (HCC)  Assessment & Plan  Continue lung protective ventilation strategies targeting a plateau pressure less than 30  Titrate FiO2 to goal SaO2 greater than 88%, PaO2 greater than 55  Status post proning and Flolan protocols    Encephalopathy acute  Assessment & Plan  Consistent with severe anoxic brain injury with concerns for probable brain death  Neurology consulted  Performing apnea exam today with adjunctive tests if unable to tolerate  Limit sedatives as able with close neurologic monitoring  Continue empiric Keppra  Continue hypertonic saline drip titrating to sodium between 145 and 155 for ICP management  Donor network consulted, family is requesting donation if patient is     Septic shock (HCC)- (present on admission)  Assessment & Plan  This is Septic shock Present on admission -uncontrolled  SIRS criteria identified on my evaluation include: Hypothermia, with temperature less than 96.8 deg F and Tachycardia, with heart rate greater than 90 BPM  Source is pulmonary  Presentation includes: Severe sepsis present, persistent hypotension after 30 ml/kg completed, and initial lactate level result is >= 4 mmol/L.   Despite appropriate fluid resuscitation with crystalloid given per sepsis guidelines, the patient remains hypotensive with systolic blood pressure less than 90 or MAP less than 65  Hemodynamic support with  additional fluids and IV vasopressors as needed to maintain a SBP of 90 or MAP of 65  IV antibiotics as appropriate for source of sepsis  Reassessment: I have reassessed the patient's hemodynamic status    Status post sepsis protocol/resuscitation  Continue broad-spectrum antibiotics/antiviral  Follow-up on final cultures  Continue to titrate norepinephrine to maintain mean arterial pressure greater than 65, trend CVP, lactic acid level, urine output    Acute respiratory failure with hypoxia (HCC)- (present on admission)  Assessment & Plan  Intubated 12/5  Continue full mechanical ventilatory support, decrease PEEP to 12, respiratory to 18  Advance endotracheal tube by 2 cm  Limit all sedatives/analgesics for close neurologic monitoring  RT/O2 protocol  Lung protective ventilation strategy in the setting of severe ARDS    Cardiac arrest (HCC)- (present on admission)  Assessment & Plan  Suspect hypoxia/respiratory failure induced  Continue supportive care  Continuous telemetry monitoring    PHUC (acute kidney injury) ([facility])  Assessment & Plan  Suspect ATN/prerenal -unchanged today, nonoliguric  Avoid nephrotoxins  Monitor creatinine, urine output, electrolytes closely    Elevated troponin- (present on admission)  Assessment & Plan  Secondary to demand ischemia -improved  No further intervention needed    Metabolic acidosis- (present on admission)  Assessment & Plan  Secondary to systemic hypoperfusion fusion and cardiac arrest -improving  Daily BMP, maintain perfusion    Pneumonia- (present on admission)  Assessment & Plan  Viral etiology with possible bacterial superinfection  Continue antiviral and antibiotics  Follow-up on final blood and sputum cultures    Thrombocytopenia (HCC)  Assessment & Plan  Daily CBC, monitor for bleeding    Hypokalemia  Assessment & Plan  Repletion and monitor daily    Hypocalcemia  Assessment & Plan  Repleted       VTE:  Heparin  Ulcer: H2 Antagonist  Lines: Central Line  Ongoing  indication addressed, Arterial Line  Ongoing indication addressed and Fried Catheter  Ongoing indication addressed    I have performed a physical exam and reviewed and updated ROS and Plan today (12/7/2019). In review of yesterday's note (12/6/2019), there are no changes except as documented above.     Patient remains critically ill today required my active management of his full mechanical ventilatory support, titration of norepinephrine infusion and ongoing assessment of his neurologic condition with concern for possible brain death.  Multiple discussions were had with patient's family, neurology and the medical team. High risk of deterioration and worsening vital organ dysfunction and death without the above critical care interventions.    Discussed patient condition and risk of morbidity and/or mortality with Family, RN, RT, Pharmacy, , Charge nurse / hot rounds, neurology and Donor network services  The patient remains critically ill.  Critical care time = 92 minutes in directly providing and coordinating critical care and extensive data review.  No time overlap and excludes procedures.

## 2019-12-07 NOTE — PROGRESS NOTES
Hospital Neurology Progress Note:     Interval History: No acute events overnight.  No complaints today unable to obtain review of systems due to intubation.    Objective:   Vitals:    12/07/19 0722 12/07/19 0730 12/07/19 0800 12/07/19 0900   BP:   (!) 81/42 (!) 82/48   Pulse: 77 (!) 102 95 94   Resp:   18 (!) 21   Temp:   36.5 °C (97.7 °F)    TempSrc:   Bladder    SpO2: 99% 99% 99% 100%   Weight:       Height:           Labs:     Lab Results   Component Value Date/Time    PROTHROMBTM 20.3 (H) 12/05/2019 04:17 PM    INR 1.68 (H) 12/05/2019 04:17 PM      Lab Results   Component Value Date/Time    WBC 19.9 (H) 12/07/2019 04:15 AM    RBC 4.05 (L) 12/07/2019 04:15 AM    HEMOGLOBIN 12.1 (L) 12/07/2019 04:15 AM    HEMATOCRIT 35.4 (L) 12/07/2019 04:15 AM    MCV 87.4 12/07/2019 04:15 AM    MCH 29.9 12/07/2019 04:15 AM    MCHC 34.2 12/07/2019 04:15 AM    MPV 11.9 12/07/2019 04:15 AM    NEUTSPOLYS 89.60 (H) 12/07/2019 04:15 AM    LYMPHOCYTES 4.30 (L) 12/07/2019 04:15 AM    MONOCYTES 0.90 12/07/2019 04:15 AM    EOSINOPHILS 0.00 12/07/2019 04:15 AM    BASOPHILS 0.00 12/07/2019 04:15 AM    HYPOCHROMIA 1+ 07/31/2012 08:45 PM    ANISOCYTOSIS 1+ 12/07/2019 04:15 AM      Lab Results   Component Value Date/Time    SODIUM 147 (H) 12/07/2019 10:52 AM    POTASSIUM 3.3 (L) 12/07/2019 10:52 AM    CHLORIDE 107 12/07/2019 10:52 AM    CO2 29 12/07/2019 10:52 AM    GLUCOSE 124 (H) 12/07/2019 10:52 AM    BUN 32 (H) 12/07/2019 10:52 AM    CREATININE 1.79 (H) 12/07/2019 10:52 AM      Lab Results   Component Value Date/Time    TRIGLYCERIDE 138 12/07/2019 04:15 AM       Lab Results   Component Value Date/Time    ALKPHOSPHAT 100 (H) 12/05/2019 08:45 AM    ASTSGOT 169 (H) 12/05/2019 08:45 AM    ALTSGPT 187 (H) 12/05/2019 08:45 AM    TBILIRUBIN 0.3 12/05/2019 08:45 AM        Imaging/Testing:   MRI of the brain without contrast on 12/6/2019 was personally reviewed and was consistent with global anoxic brain injury as well as severe edema.    Physical  Exam:      General: 19 y/o male intubated  Cardio: Deferred  Pulm: Deferred  Skin: Warm, dry, no rashes or lesions   Psychiatric: Unable to assess  HEENT: Atraumatic head, normal sclera and conjunctiva, moist oral mucosa. No lid lag.  Abdomen: Soft, non tender. No masses or hepatosplenomegaly.     Neurologic:  Mental Status: GCS 3T (E1, M1, V1T)   Cranial Nerves: Right pupillary asymmetry, pupils unreactive to light.  Face is symmetric.  Oculocephalic reflex absent.  Corneal reflex absent.  Absent gag and cough.  Motor: Normal muscle tone and bulk.  No movement observed.  Reflexes:  Upgoing toes bilaterally  Coordination: Unable to assess  Sensation: Does not withdraw to noxious stimulus  Gait/Station: Unable to assess    Assessment/Plan:    Jonny French is a 18 y.o. male with no history of systemic illness and recent infection of influenza B with resulting multiorgan failure, septic shock, ARDS cardiac arrest (10 minutes until return of spontaneous circulation), previously on pressor support with acidosis, hypoxia and consulted to neurology for possible seizure activity/prognostication.    Fortunately, MRI of the brain without contrast was consistent with global anoxic brain injury.  I spent some time with the family discussing the physical exam findings and examining the patient with family members that were available as well as showing in discussing the MRI.  I also discussed with them the overall very poor prognosis for the patient and that this is suggestive of brain death.  They were understanding and in agreement with my assessment.    Plan:  1.  Apnea test per Dr. Gonda  2.  If apnea test is positive neurology signs off.  3.  Plan discussed with consulting physician and patient's nurse.     Tristan Block M.D., Diplomat of the American Board of Psychiatry and Neurology  Diplomat of [employer] Epilepsy Subspecialty   Assistant Clinical Professor, [facility] Neurology Consultant    45  minutes spent 100% of which was face-to-face with the patient's family discussing physical exam, prognosis, discussing imaging findings and overall having a discussion about global anoxic brain injury and brain death.

## 2019-12-07 NOTE — PROGRESS NOTES
Donald from Lab called with critical result of Lactic Acid at 6. Critical lab result read back to Donald.   Dr. Dahl notified of critical lab result at 2230.  Critical lab result read back by Dr. Dahl.    Dr Dahl ordered to d/c lactic acid draws.

## 2019-12-07 NOTE — CARE PLAN
Problem: Ventilation Defect:  Goal: Ability to achieve and maintain unassisted ventilation or tolerate decreased levels of ventilator support  Outcome: PROGRESSING AS EXPECTED        Ventilator Daily Summary    Vent Day # 2    Ventilator settings changed this shift: APV/CMV 25 440 16 40%    Plan: Continue current ventilator settings and wean mechanical ventilation as tolerated per physician orders.

## 2019-12-07 NOTE — ED NOTES
Child Life services introduced to pt's family at bedside. Emotional support provided. Discussed coming back when pt's 14-year-old brother arrives to provide additional support and resources. Contact info left with bedside RN. No additional child life needs were noted at this time, but will follow to assess and provide services as needed.

## 2019-12-07 NOTE — PROGRESS NOTES
Brain death note:    Patient is an unfortunate 17yo male with a devastating anoxic brain injury post cardiac arrest with associated cerebral edema and elevated ICPs leading to herniation. Patient has remained off sedatives/paralytics since yesterday afternoon and temperature and electrolytes normalized as best as able, and any other potential confounding variables have been addressed.  He remains on vasopressor support with adequate oxygenation. On exam at this time, patient has no signs of brainstem reflexes (no pupillary response, gag, cough, corneal, reflexes and absent oculocephalic and oculovestibular reflexes). Patient does not withdraw nor posture to deep painful stimuli whether peripheral or central. Physical exam findings consistent with brain death. Proceeded to perform an apnea test. Patient was preoxygenated with 100% FiO2. Remained on vasopressor support throughout the test and maintain a mean arterial pressure greater than 65 without difficulty. Pre-and post arterial blood gas were performed demonstrating an increase in PaCO2 of greater than 20 points. Unfortunately patient did not exhibit any spontaneous respiratory effort despite the climb and carbon dioxide levels all consistent with brain death.    Time of death: 11:20 AM  Date of Death: 12/7/2019    Pre-apnea test arterial blood gas (time 1106) pH 7.53 PCO2 39 PaO2 459    Post apnea test (time 1120) pH 7.31 PCO2 72 PaO2 425    RN, RT, patient's family, SW, and organ donation services notified of findings.

## 2019-12-07 NOTE — CARE PLAN
Adult Ventilation Update    Total Vent Days: 3  Vent: APVCMV 25/440/+16/40%    Patient Lines/Drains/Airways Status      Active Airway       Name: Placement date: Placement time: Site: Days:    Airway ETT Oral 8.0  --   --   Oral                    Plateau Pressure (Q Shift): 26 (12/06/19 1816)  Static Compliance (ml / cm H2O): 32.7 (12/06/19 2210)    Patient failed trials because of    Barriers to SBT    Length of Weaning Trial      Cough: Non Productive (12/06/19 2210)  Sputum Amount: Unable to Evaluate (12/06/19 2210)  Sputum Color: Unable to Evaluate (12/06/19 2210)  Sputum Consistency: Unable to Evaluate (12/06/19 2210)    Mobility  Level of Mobility: Level I (12/06/19 2000)  Activity Performed: Unable to mobilize (12/06/19 2000)  Reason Not Mobilized: Unstable condition (12/06/19 2000)  Mobilization Comments: RASS -5 (12/06/19 2000)    Events/Summary/Plan: Flolan titration complete (12/06/19 1922). No other vent changes made this shift. Pt currently stable on vent.

## 2019-12-07 NOTE — DISCHARGE PLANNING
SW asked to met with patient's family to provide emotional support as family just received difficult information about patient's medical condition.  DAVEY met with patient's hali and her parents bedside. Hali appears to be well supported by her family.  DAVEY attempted to meet with patient's mother, sister, and grandmother in [facility].  SW asked to come back tomorrow, family just needs sometime. Per LORENA Vincent patient is currently stable.  They will call DAVEY if there is a decline.

## 2019-12-08 NOTE — PROGRESS NOTES
"Pharmacy Kinetics 18 y.o. male on vancomycin day # 1 2019    Currently on Vancomycin 1800 mg load followed by 1400 mg  iv q12hr  Provider specified end date: TBD    Indication for Treatment: Pneumonia    Pertinent history per medical record: Admitted on 2019 for post cardiac arrest with CT indicative of  ARDS or atypical multifocal pneumonia. Vancomycin initiated , but discontinued once MRSA nares was negative. Restarted today for pneumonia/infection control now that patient is donor status.     Other antibiotics: Zosyn 3.375g q8h    Allergies: Patient has no known allergies.     List concerns for renal function : PHUC, brain death with autonomic dysregulation    Pertinent cultures to date:   : blood cultures pending, respiratory culture pending, fungal smear: negative, urine: negative, respiratory gram stain: negative      MRSA nares swab if pneumonia is a concern (ordered/positive/negative/n-a): NA    Recent Labs     19  0845 19  0420 19  0415 19  1550 19  2105 19  0306   WBC 7.1 33.2* 19.9* 18.7* 14.0* 13.8*   NEUTSPOLYS 16.80* 52.60 89.60* 58.90 96.50* 95.60*   BANDSSTABS 9.80 23.30* 5.20 16.10*  --  3.50     Recent Labs     19  0845  19  0415 19  1052 19  1550 19  2105 19  0306   BUN 28*   < > 32* 32* 30* 31* 30*   CREATININE 2.91*   < > 1.79* 1.79* 1.70* 1.71* 1.72*   ALBUMIN 2.9*  --   --   --  2.6* 3.0* 3.1*    < > = values in this interval not displayed.     Recent Labs     19  2350 19  1418   VANCORANDOM 16.6 17.8       Intake/Output Summary (Last 24 hours) at 2019 0529  Last data filed at 2019 0500  Gross per 24 hour   Intake 2912.55 ml   Output 3920 ml   Net -1007.45 ml      /56   Pulse (!) 105   Temp 36.1 °C (97 °F) (Bladder)   Resp (!) 7   Ht 1.78 m (5' 10.08\")   Wt 73.7 kg (162 lb 7.7 oz)   SpO2 97%  Temp (24hrs), Av.7 °C (96.2 °F), Min:35.4 °C (95.7 °F), Max:36.1 °C (97 " °F)      A/P   1. Vancomycin dose change: no change- initiated 20mg/kg (1400mg) q12h due to PHUC/lower CrCl than expected for age.   2. Next vancomycin level: not scheduled currently   3. Goal trough: 16-20  4. Comments: Please watch for renal function and urine output for dose adjustments.     Vivi Staton, DgD

## 2019-12-08 NOTE — CARE PLAN
Adult Ventilation Update    Total Vent Days: 4  Vent: APVCMV 12/440/+5/50%    Patient Lines/Drains/Airways Status      Active Airway       Name: Placement date: Placement time: Site: Days:    Airway ETT Oral 8.0  --   --   Oral  4                    Plateau Pressure (Q Shift): 19 (12/07/19 1911)  Static Compliance (ml / cm H2O): 39.1 (12/07/19 2248)    Patient failed trials because of    Barriers to SBT    Length of Weaning Trial      Cough: Non Productive (12/07/19 2248)  Sputum Amount: Unable to Evaluate (12/07/19 2248)  Sputum Color: Unable to Evaluate (12/07/19 2248)  Sputum Consistency: Unable to Evaluate (12/07/19 2248)    Events/Summary/Plan: Pt currently managed by [facility].

## 2019-12-08 NOTE — PROGRESS NOTES
Therese from Lab called with critical result of ALT at 1910 and CPK at 9181. Critical lab result read back to Therese.   Donor team notified of critical lab results.

## 2019-12-08 NOTE — PROGRESS NOTES
Telemetry strip interpretation:  Sinus Tachycardia  Rate 110s  No ectopy noted  NE interval 0.12  QRS 0.08  ST 0.3

## 2019-12-08 NOTE — PROGRESS NOTES
Therese from Lab called with critical result of PK=856, ALT=2118, SPJ=3456 at 2211. Critical lab result read back to CHRISTOPH Randhawa RN.   ANITA Gee notified of critical lab result at 2211.  Critical lab result read back by ANITA Gee.

## 2019-12-09 NOTE — PROGRESS NOTES
Spiritual Care Note    Patient Information     Patient's Name: Ryan French   MRN: [id number]    YOB: 2000   Age and Gender: 18 y.o. male   Service Area: CARDIAC ICU [facility]   Room (and Bed): T6[facility]   Ethnicity or Nationality:     Primary Language: English   Adventist/Spiritual preference: Unknown   Place of Residence: [place]   Code Status: Full Code    Date of Admission: 12/7/2019   Length of Stay: 2 days        Spiritual Care Provider Information:  Name of Spiritual Care Provider: Shawna Perera  Title of Spiritual Care Provider: Associate   Phone Number: [phone]  E-mail: [email or web address]    minutes    Spiritual Screen Results:    Gen Nursing        Palliative Care         Encounter/Request Information  Encounter/Request Type    Visited With: Health care provider  Nature of the Visit: Initial, On shift  Referral From/ Origin of Request: (Donor Network invited  presence during the donor wal)    Religous Needs/Values       Spiritual Assessment   Spiritual Care Encounters    Interacton/Conversation:  will be present for donor walk    Notes:

## 2019-12-09 NOTE — CARE PLAN
Adult Ventilation Update    Total Vent Days: 5  Vent: SIMV 8/730/+5/Ps5/45  ETT: 8.0 @ 28  Albuterol Q4    Patient Lines/Drains/Airways Status      Active Airway       Name: Placement date: Placement time: Site: Days:    Airway ETT Oral 8.0  --   --   Oral  5                  Plateau Pressure (Q Shift): 22 (12/09/19 0228)  Static Compliance (ml / cm H2O): 44.3 (12/09/19 0228)    Patient failed trials because of    Barriers to SBT    Length of Weaning Trial      Cough: Non Productive (12/09/19 0228)  Sputum Amount: Scant (12/09/19 0228)  Sputum Color: Clear (12/09/19 0228)  Sputum Consistency: Thin (12/09/19 0228)    Mobility  Level of Mobility: Level I (12/08/19 2000)  Activity Performed: Unable to mobilize (12/08/19 2000)  Mobilization Comments: (Donor) (12/08/19 2000)    Events/Summary/Plan: Pt to cath lab this shift. Pt care currently managed by donor network.

## 2019-12-09 NOTE — CONSULTS
Billing Purposes      Donor Network [place] Referral:  # [id number]       Beginning of Brain Death Billing:    Donor Manuel [place] assumes billing of this Brain Dead patient as authorized on this  12/7/2019    and  11:20 a.m.        If you have any questions/concerns regarding billing please contact Yareli Macario, Donation Affairs Advisor, at [phone]      Thank you for your continued support of organ and tissue donation.

## 2019-12-09 NOTE — OP REPORT
Cardiac catheterization report    Procedure date: 12/8/2019    Referring physician: Donor network    Post-operative Diagnosis:   1. No evidence of coronary artery disease  2. Normal right heart pressure    Procedure:  1. Selective coronary angiography  2. Right heart cathetrization    Complications: None    Description of Procedure:  After proper consent was verified, the dponor was brought to cardiac catheterization laboratory.   Both groins were then prepped and drapped in sterile fashion.  Using portable ultrasound, 4 and 8 Greek sheaths were then placed in the right femoral artery and femoral vein respectively using modified Seldinger technique.    Selective angiography of the left coronary artery were performed in multiple views using 4 Greek JL4 catheter.  Selective angiography of the right coronary artery was then performed using full Greek 3 DRC catheter.       An 8 Greek balloon tipped, thermodilution catheter was then advanced into the pulmonary artery over the guide wire.and subsequently in to wedged positioned  Pulmonary capillary wedged pressure was then recorded.   PA pressure was recorded.  The catheter was left in placed.    Both sheaths were sutured to the skin using 2-0 silk.  The donor was then transported [facility].    Findings:    Pulmonary capillary wedge pressure mean was 14 mmHg.  Pulmonary artery pressure was 27/18 with mean of 22 mmHg.  Mean right atrial pressure was 9 mmHg.    No evidence of coronary artery disease    This is co-dominant system.    Left main is large and without flow limiting disease.  It bifurcated into left anterior descending and left circumflex artery.     Left anterior descending artery is large caliber vessel and extends to the apex.  It gives rises to several diagonal branches.  There is no signficant disease in the left anterior descending artery or its major branches.  The antegrade flow is normal.    Left circumflex artery is large in caliber.   It  gives rise to several obtuse marginal branches and posterior descending artery.  There is no significant disease in the LCX system.  The antegrade flow is normal.    Right coronary artery (RCA) is large caliber. It gives rise to several small acute marginal branches, and posterior descending artery but no signficant posterolateral branch.  There is no significant disease in the right coronary artery or its major branches.  The antegrade flow is normal.

## 2019-12-10 LAB
BACTERIA BLD CULT: NORMAL
BACTERIA BLD CULT: NORMAL
BACTERIA UR CULT: NORMAL
SIGNIFICANT IND 70042: NORMAL
SITE SITE: NORMAL
SOURCE SOURCE: NORMAL

## 2019-12-12 LAB
BACTERIA BLD CULT: NORMAL
BACTERIA BLD CULT: NORMAL
SIGNIFICANT IND 70042: NORMAL
SIGNIFICANT IND 70042: NORMAL
SITE SITE: NORMAL
SITE SITE: NORMAL
SOURCE SOURCE: NORMAL
SOURCE SOURCE: NORMAL

## 2019-12-15 NOTE — DISCHARGE SUMMARY
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    Comorbid Conditions at the Time of Death  Active Problems:    Cardiac arrest (HCC) POA: Yes    Acute respiratory failure with hypoxia (HCC) POA: Yes    Septic shock (HCC) POA: Yes    Encephalopathy acute POA: Unknown    ARDS (adult respiratory distress syndrome) (HCC) POA: Unknown    Pneumonia POA: Yes    Metabolic acidosis POA: Yes    Elevated troponin POA: Yes    PHUC (acute kidney injury) (HCC) POA: Unknown    Hypocalcemia POA: Unknown    Hypokalemia POA: Unknown    Thrombocytopenia (HCC) POA: Unknown  Resolved Problems:    * No resolved hospital problems. *      History of Presenting Illness and Hospital Course  This is a 18 y.o. male admitted 12/5/2019 with no significant past medical history post out of hospital cardiac arrest.  It appears that patient had not been feeling well for several days.  URI type symptoms, general unwellness, and flulike symptoms.  His girlfriend noted him this morning to be diaphoretic and less responsive, she attempted to douse him with cold water however he subsequently became less responsive and pulseless. Immediate bystander CPR was initiated, reportedly patient was asystolic on EMS arrival and had a total of approximately 10 minutes of ACLS prior to ROSC.  Further he was noted to be hypoxic and hypoglycemic.  On arrival patient with labile pressure, initially requiring 2 pressors, was intubated in the field, stat CT head was negative for acute findings, CT chest with diffuse bilateral reticulonodular opacities.  Clinically patient ARDS, shock, multiorgan failure, with uncertainty to any underlying neurologic injury from cardiac arrest.  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 Flu B returned positive.  Tamiflu  and broad-spectrum antibiotics initiated.   Patient required intermittent vasopressor support and remained with acute kidney injury and multiorgan failure.  He underwent paralysis and heavy sedation initially given his severe ARDS and difficulty with oxygenation and ventilation.  Once these medications were weaned off he was found to be unresponsive neurologically.  Neurology was consulted and patient under went imaging of his brain including EEG, CT and MRI which unfortunately revealed severe anoxic injury.  He lost all brainstem reflexes on date of death and apnea exam confirmed our fears of brain death.    Consults:  Cardiology Dr. Alvarez 12/5  Pulmonary/critical care Dr. Kyle 12/5  Neurology Dr. Block 12/6    Procedures:  Bronchoscopy 12/5  Central venous catheter insertion 12/5  Arterial catheter insertion 12/5  EEG 12/6    Death Date: 12/07/19   Death Time: 1120    Pronounced By (MD): Dr. Jeremy Gonda

## 2019-12-16 LAB
P JIROVECII DNA SPEC QL NAA+PROBE: NOT DETECTED
SPECIMEN SOURCE: NORMAL

## 2020-01-01 LAB
FUNGUS SPEC CULT: NORMAL
SIGNIFICANT IND 70042: NORMAL
SITE SITE: NORMAL
SOURCE SOURCE: NORMAL

## 2020-01-30 LAB
MYCOBACTERIUM SPEC CULT: NORMAL
RHODAMINE-AURAMINE STN SPEC: NORMAL
SIGNIFICANT IND 70042: NORMAL
SITE SITE: NORMAL
SOURCE SOURCE: NORMAL

## 2024-04-07 NOTE — ASSESSMENT & PLAN NOTE
Secondary to systemic hypoperfusion fusion and cardiac arrest -improving  Daily BMP, maintain perfusion   I have been feeling sick x 2 days with fever and cough, feeling weak and have bodyaches

## (undated) DEVICE — GLOVE BIOGEL INDICATOR SZ 7.5 SURGICAL PF LTX - (50PR/BX 4BX/CA)

## (undated) DEVICE — STAPLER 75MM LINEAR OPEN (3EA/BX)

## (undated) DEVICE — GLOVE BIOGEL INDICATOR SZ 6.5 SURGICAL PF LTX - (50PR/BX 4BX/CA)

## (undated) DEVICE — DRAPE LARGE 3 QUARTER - (20/CA)

## (undated) DEVICE — SPONGE PEANUT - (5/PK 50PK/CA)

## (undated) DEVICE — STAPLE 75MM LINEAR (12EA/BX)

## (undated) DEVICE — SUTURE 2 ETHILON LR D/A - (24/BX) 30 INCH

## (undated) DEVICE — SODIUM CHL IRRIGATION 0.9% 1000ML (12EA/CA)

## (undated) DEVICE — GLOVE BIOGEL PI INDICATOR SZ 7.5 SURGICAL PF LF -(50/BX 4BX/CA)

## (undated) DEVICE — PROTECTOR ULNA NERVE - (36PR/CA)

## (undated) DEVICE — LACTATED RINGERS INJ 1000 ML - (14EA/CA 60CA/PF)

## (undated) DEVICE — SUTURE 3-0 SILK SH 30 (36PK/BX)

## (undated) DEVICE — ELECTRODE 850 FOAM ADHESIVE - HYDROGEL RADIOTRNSPRNT (50/PK)

## (undated) DEVICE — LIGASURE SM JAW SEALER CRVD - (6EA/CA)

## (undated) DEVICE — GLOVE BIOGEL SZ 6 PF LATEX - (50EA/BX 4BX/CA)

## (undated) DEVICE — SUTURE 0 SILK TIES (36PK/BX)

## (undated) DEVICE — CLIP LG INTNL HRZN TI ESCP LGT - (20/BX)

## (undated) DEVICE — BOVIE BLADE COATED - (50/PK)

## (undated) DEVICE — CORDS BIPOLAR COAGULATION - 12FT STERILE DISP. (10EA/BX)

## (undated) DEVICE — GLOVE BIOGEL SZ 7.5 SURGICAL PF LTX - (50PR/BX 4BX/CA)

## (undated) DEVICE — SUTURE 0 SILK MO-7 C/R 8 X 18 (12PK/BX)"

## (undated) DEVICE — TUBE CONNECT SUCTION CLEAR 120 X 1/4" (50EA/CA)"

## (undated) DEVICE — SUTURE 2-0 SILK 12 X 18" (36PK/BX)"

## (undated) DEVICE — GOWN SURGICAL X-LARGE ULTRA - FILM-REINFORCED (20/CA)

## (undated) DEVICE — GLOVE BIOGEL ECLIPSE  PF LATEX SIZE 6.5 (50PR/BX)

## (undated) DEVICE — GLOVE BIOGEL PI ORTHO SZ 6 1/2 SURGICAL PF LF (40PR/BX)

## (undated) DEVICE — SUTURE 5-0 PROLENE RB-1 D/A 36 (36PK/BX)"

## (undated) DEVICE — SET EXTENSION WITH 2 PORTS (48EA/CA) ***PART #2C8610 IS A SUBSTITUTE*****

## (undated) DEVICE — KIT ANESTHESIA W/CIRCUIT & 3/LT BAG W/FILTER (20EA/CA)

## (undated) DEVICE — SUTURE 3-0 SILK 12 X 18 IN - (36/BX)

## (undated) DEVICE — GLOVE BIOGEL SZ 6.5 SURGICAL PF LTX (50PR/BX 4BX/CA)

## (undated) DEVICE — SYRINGE ASEPTO - (50EA/CA

## (undated) DEVICE — SUTURE 4-0 PROLENE SH 36 (36PK/BX)"

## (undated) DEVICE — RELOAD WITH GRIPPING SURFACE TECHNOLOGY BLUE 60MM (12EA/BX)

## (undated) DEVICE — DRAPE MEDI-SLUSH STERILE  - (40/CA)

## (undated) DEVICE — CHLORAPREP 26 ML APPLICATOR - ORANGE TINT(25/CA)

## (undated) DEVICE — GOWN SURGEONS LARGE - (32/CA)

## (undated) DEVICE — CLIP MED LG INTNL HRZN TI ESCP - (20/BX)

## (undated) DEVICE — PACK SINGLE BASIN - (6/CA)

## (undated) DEVICE — CANISTER SUCTION 3000ML MECHANICAL FILTER AUTO SHUTOFF MEDI-VAC NONSTERILE LF DISP  (40EA/CA)

## (undated) DEVICE — SUTURE 4-0 PROLENE PS-2 18 (36PK/BX)"

## (undated) DEVICE — DRAPE IOBAN II 23 IN X 33 IN - (10/BX)

## (undated) DEVICE — GLOVE BIOGEL SZ 7 SURGICAL PF LTX - (50PR/BX 4BX/CA)

## (undated) DEVICE — GLOVE BIOGEL PI INDICATOR SZ 7.0 SURGICAL PF LF - (50/BX 4BX/CA)

## (undated) DEVICE — GOWN SURGEONS X-LARGE - DISP. (30/CA)

## (undated) DEVICE — MASK ANESTHESIA ADULT  - (100/CA)

## (undated) DEVICE — SUTURE 6-0 PROLENE BV-1 D/A 24 (36PK/BX)"

## (undated) DEVICE — GLOVE BIOGEL INDICATOR SZ 7SURGICAL PF LTX - (50/BX 4BX/CA)

## (undated) DEVICE — STAPLE 60MM WHTE 2.6MM - (12/BX) WAS PART #ECR60W

## (undated) DEVICE — PENCIL ELECTSURG 10FT BTN SWH - (50/CA)

## (undated) DEVICE — DETERGENT RENUZYME PLUS 10 OZ PACKET (50/BX)

## (undated) DEVICE — SUCTION INSTRUMENT YANKAUER BULBOUS TIP W/O VENT (50EA/CA)

## (undated) DEVICE — SUTURE GENERAL

## (undated) DEVICE — TOWELS CLOTH SURGICAL - (4/PK 20PK/CA)

## (undated) DEVICE — LIGASURE 5MM BLUNT TIP LONG - 44CM (6EA/PK)

## (undated) DEVICE — BLADE STERNUM SAW SURGICAL 32.0 X 6.4 MM STERILE (1/EA)

## (undated) DEVICE — TUBING CLEARLINK DUO-VENT - C-FLO (48EA/CA)

## (undated) DEVICE — SUTURE 0 SILK 12 X 18 (36PK/BX)

## (undated) DEVICE — Device

## (undated) DEVICE — GLOVE SZ 7 BIOGEL PI MICRO - PF LF (50PR/BX 4BX/CA)

## (undated) DEVICE — NEPTUNE 4 PORT MANIFOLD - (20/PK)

## (undated) DEVICE — SET LEADWIRE 5 LEAD BEDSIDE DISPOSABLE ECG (1SET OF 5/EA)

## (undated) DEVICE — DRAPE MAYO STAND - (30/CA)

## (undated) DEVICE — SENSOR SPO2 NEO LNCS ADHESIVE (20/BX) SEE USER NOTES

## (undated) DEVICE — SUTURE 2-0 SILK SH 24 (36PK/BX)"

## (undated) DEVICE — ELECTRODE DUAL RETURN W/ CORD - (50/PK)

## (undated) DEVICE — GLOVE BIOGEL ECLIPSE PF LATEX SIZE 9.0

## (undated) DEVICE — SUTURE 1 SILK 2 X 60 (36PK/BX)

## (undated) DEVICE — CLIP MED INTNL HRZN TI ESCP - (25/BX)

## (undated) DEVICE — HEAD HOLDER JUNIOR/ADULT

## (undated) DEVICE — SUCTION INSTRUMENT YANKAUER OPEN TIP W/O VENT (50EA/CA)

## (undated) DEVICE — SUTURE CV

## (undated) DEVICE — BOVIE  BLADE 6 EXTENDED - (50/PK)

## (undated) DEVICE — STAPLER 60MM ARTICULATING (3EA/BX)

## (undated) DEVICE — KIT ROOM DECONTAMINATION